# Patient Record
Sex: FEMALE | Race: WHITE | NOT HISPANIC OR LATINO
[De-identification: names, ages, dates, MRNs, and addresses within clinical notes are randomized per-mention and may not be internally consistent; named-entity substitution may affect disease eponyms.]

---

## 2017-07-05 ENCOUNTER — APPOINTMENT (OUTPATIENT)
Dept: ENDOCRINOLOGY | Facility: CLINIC | Age: 72
End: 2017-07-05

## 2017-07-05 VITALS
DIASTOLIC BLOOD PRESSURE: 76 MMHG | OXYGEN SATURATION: 98 % | HEART RATE: 72 BPM | BODY MASS INDEX: 20.66 KG/M2 | HEIGHT: 65 IN | WEIGHT: 124 LBS | SYSTOLIC BLOOD PRESSURE: 126 MMHG

## 2017-07-05 RX ORDER — CEFADROXIL 500 MG/1
500 CAPSULE ORAL
Qty: 14 | Refills: 0 | Status: COMPLETED | COMMUNITY
Start: 2017-06-06

## 2017-07-05 RX ORDER — MOMETASONE FUROATE 1 MG/ML
0.1 SOLUTION TOPICAL
Qty: 60 | Refills: 0 | Status: COMPLETED | COMMUNITY
Start: 2017-04-11

## 2017-07-05 RX ORDER — DENOSUMAB 60 MG/ML
60 INJECTION SUBCUTANEOUS
Qty: 1 | Refills: 0 | Status: COMPLETED | OUTPATIENT
Start: 2017-07-05

## 2017-07-05 RX ORDER — OXYCODONE AND ACETAMINOPHEN 5; 325 MG/1; MG/1
5-325 TABLET ORAL
Qty: 10 | Refills: 0 | Status: COMPLETED | COMMUNITY
Start: 2017-06-06

## 2017-07-05 RX ADMIN — DENOSUMAB 0 MG/ML: 60 INJECTION SUBCUTANEOUS at 00:00

## 2018-01-22 ENCOUNTER — APPOINTMENT (OUTPATIENT)
Dept: ENDOCRINOLOGY | Facility: CLINIC | Age: 73
End: 2018-01-22
Payer: MEDICARE

## 2018-01-22 VITALS
HEART RATE: 87 BPM | DIASTOLIC BLOOD PRESSURE: 68 MMHG | WEIGHT: 126 LBS | BODY MASS INDEX: 20.99 KG/M2 | HEIGHT: 65 IN | OXYGEN SATURATION: 98 % | SYSTOLIC BLOOD PRESSURE: 138 MMHG

## 2018-01-22 PROCEDURE — 96372 THER/PROPH/DIAG INJ SC/IM: CPT

## 2018-01-22 PROCEDURE — 99214 OFFICE O/P EST MOD 30 MIN: CPT | Mod: 25

## 2018-01-22 RX ORDER — DENOSUMAB 60 MG/ML
60 INJECTION SUBCUTANEOUS
Qty: 0 | Refills: 0 | Status: COMPLETED | OUTPATIENT
Start: 2018-01-22

## 2018-01-22 RX ADMIN — DENOSUMAB 0 MG/ML: 60 INJECTION SUBCUTANEOUS at 00:00

## 2018-03-15 ENCOUNTER — APPOINTMENT (OUTPATIENT)
Dept: OBGYN | Facility: CLINIC | Age: 73
End: 2018-03-15
Payer: MEDICARE

## 2018-03-15 VITALS
WEIGHT: 125 LBS | DIASTOLIC BLOOD PRESSURE: 67 MMHG | BODY MASS INDEX: 20.83 KG/M2 | SYSTOLIC BLOOD PRESSURE: 151 MMHG | HEIGHT: 65 IN

## 2018-03-15 DIAGNOSIS — Z01.419 ENCOUNTER FOR GYNECOLOGICAL EXAMINATION (GENERAL) (ROUTINE) W/OUT ABNORMAL FINDINGS: ICD-10-CM

## 2018-03-15 PROCEDURE — G0101: CPT

## 2018-03-18 LAB — HPV HIGH+LOW RISK DNA PNL CVX: NOT DETECTED

## 2018-03-22 LAB — CYTOLOGY CVX/VAG DOC THIN PREP: NORMAL

## 2018-08-10 ENCOUNTER — APPOINTMENT (OUTPATIENT)
Dept: ENDOCRINOLOGY | Facility: CLINIC | Age: 73
End: 2018-08-10
Payer: MEDICARE

## 2018-08-10 VITALS
HEIGHT: 65 IN | BODY MASS INDEX: 20.99 KG/M2 | OXYGEN SATURATION: 98 % | DIASTOLIC BLOOD PRESSURE: 58 MMHG | HEART RATE: 76 BPM | SYSTOLIC BLOOD PRESSURE: 100 MMHG | WEIGHT: 126 LBS

## 2018-08-10 PROCEDURE — 99214 OFFICE O/P EST MOD 30 MIN: CPT | Mod: 25

## 2018-08-10 PROCEDURE — 96372 THER/PROPH/DIAG INJ SC/IM: CPT

## 2018-08-10 RX ORDER — DENOSUMAB 60 MG/ML
60 INJECTION SUBCUTANEOUS
Qty: 0 | Refills: 0 | Status: COMPLETED | OUTPATIENT
Start: 2018-08-10

## 2018-08-10 RX ADMIN — DENOSUMAB 0 MG/ML: 60 INJECTION SUBCUTANEOUS at 00:00

## 2019-01-30 ENCOUNTER — APPOINTMENT (OUTPATIENT)
Dept: OTOLARYNGOLOGY | Facility: CLINIC | Age: 74
End: 2019-01-30
Payer: MEDICARE

## 2019-01-30 VITALS
WEIGHT: 125 LBS | BODY MASS INDEX: 20.09 KG/M2 | HEART RATE: 66 BPM | HEIGHT: 66 IN | SYSTOLIC BLOOD PRESSURE: 144 MMHG | DIASTOLIC BLOOD PRESSURE: 78 MMHG

## 2019-01-30 PROCEDURE — 69210 REMOVE IMPACTED EAR WAX UNI: CPT

## 2019-01-30 PROCEDURE — 99204 OFFICE O/P NEW MOD 45 MIN: CPT | Mod: 25

## 2019-01-30 PROCEDURE — 31231 NASAL ENDOSCOPY DX: CPT

## 2019-01-31 NOTE — REASON FOR VISIT
[Initial Evaluation] : an initial evaluation for [Hearing Loss] : hearing loss [Nasal Obstruction] : nasal obstruction [Epistaxis] : epistaxis

## 2019-01-31 NOTE — ASSESSMENT
[FreeTextEntry1] : DNS and Rhinitis\par Rx\par   Steam humidification and hypertonic saline nasal irrigations \par \par After informed verbal consent is obtained, cerumen is removed from the right and left  ear canal with a curette and suction.\par Rec: \par Debrox was prescribed and  is to be placed in both ears on a routine basis to keep them free of wax.\par Routine debridement suggested to keep the ears free of wax.\par \par bilateral sensorineural hearing loss-cleared for hearing aids\par \par \par

## 2019-01-31 NOTE — HISTORY OF PRESENT ILLNESS
[No] : patient does not have a  history of radiation therapy [de-identified] : 72 yo female\par chr moderate hearing loss w/assoc left tinnitus\par h/o meningioma\par also w/ moderate chr recurrent epistaxis\par no throat sx [Tinnitus] : tinnitus [Anxiety] : no anxiety [Dizziness] : no dizziness [Headache] : no headache [Hearing Loss] : hearing loss [Lightheadedness] : no lightheadedness [Neurologic Symptoms] : no associated neurologic symptoms [Orthostatic Hypotension] : no orthostatic hypotension [Otalgia] : no otalgia [Otorrhea] : no otorrhea [Vertigo] : no vertigo [Visual Changes] : no visual changes [Recurrent Otitis Media] : no recurrent otitis media [Otitis Media with Effusion] : no otitis media with effusion [Presbycusis] : presbycusis [Congenital Ear Malformation] : no congenital ear malformation [Meniere Disease] : no Meniere disease [Otosclerosis] : no otosclerosis [Perilymphatic Fistula] : no perilymphatic fistula [Hypertension] : no hypertension [Loud Noise Exposure] : no history of loud noise exposure [Smoking] : no smoking [Early Onset Hearing Loss] : no early onset hearing loss [Stroke] : no stroke [Facial Pain] : no facial pain [Facial Pressure] : no facial pressure [Nasal Congestion] : nasal congestion [None] : No associated symptoms are reported. [Chills] : no chills [Cough] : no cough [Ear Fullness] : no ear fullness [Environmental Allergens] : no environmental allergens [Allergies] : no allergies [Asthma] : no asthma

## 2019-01-31 NOTE — PHYSICAL EXAM
[de-identified] : bilat wax [Nasal Endoscopy Performed] : nasal endoscopy was performed, see procedure section for findings [] : septum deviated bilaterally [de-identified] : congested [Midline] : trachea located in midline position [Normal] : no rashes

## 2019-02-20 ENCOUNTER — APPOINTMENT (OUTPATIENT)
Dept: ENDOCRINOLOGY | Facility: CLINIC | Age: 74
End: 2019-02-20
Payer: MEDICARE

## 2019-02-20 VITALS
SYSTOLIC BLOOD PRESSURE: 116 MMHG | OXYGEN SATURATION: 98 % | BODY MASS INDEX: 19.59 KG/M2 | HEIGHT: 65.5 IN | DIASTOLIC BLOOD PRESSURE: 62 MMHG | HEART RATE: 75 BPM | WEIGHT: 119 LBS

## 2019-02-20 PROCEDURE — 96372 THER/PROPH/DIAG INJ SC/IM: CPT

## 2019-02-20 PROCEDURE — ZZZZZ: CPT

## 2019-02-20 PROCEDURE — 99214 OFFICE O/P EST MOD 30 MIN: CPT | Mod: 25

## 2019-02-20 PROCEDURE — 77080 DXA BONE DENSITY AXIAL: CPT | Mod: GA

## 2019-02-20 RX ORDER — DENOSUMAB 60 MG/ML
60 INJECTION SUBCUTANEOUS
Qty: 1 | Refills: 0 | Status: COMPLETED | OUTPATIENT
Start: 2019-02-20

## 2019-02-20 RX ORDER — ATORVASTATIN CALCIUM 10 MG/1
10 TABLET, FILM COATED ORAL
Refills: 0 | Status: COMPLETED | COMMUNITY

## 2019-02-20 RX ORDER — ACETAMINOPHEN AND PHENYLEPHRINE HYDROCHLORIDE 325; 5 MG/1; MG/1
TABLET, COATED ORAL
Refills: 0 | Status: ACTIVE | COMMUNITY

## 2019-02-20 RX ORDER — MULTIVIT-MIN/FA/LYCOPEN/LUTEIN .4-300-25
TABLET ORAL
Refills: 0 | Status: ACTIVE | COMMUNITY

## 2019-02-20 RX ADMIN — DENOSUMAB 0 MG/ML: 60 INJECTION SUBCUTANEOUS at 00:00

## 2019-02-20 NOTE — DATA REVIEWED
[FreeTextEntry1] : \par \par

## 2019-02-20 NOTE — PHYSICAL EXAM
[Alert] : alert [No Acute Distress] : no acute distress [Well Nourished] : well nourished [Well Developed] : well developed [Normal Sclera/Conjunctiva] : normal sclera/conjunctiva [No Proptosis] : no proptosis [Normal Oropharynx] : the oropharynx was normal [Thyroid Not Enlarged] : the thyroid was not enlarged [No Thyroid Nodules] : there were no palpable thyroid nodules [No Respiratory Distress] : no respiratory distress [No Accessory Muscle Use] : no accessory muscle use [Clear to Auscultation] : lungs were clear to auscultation bilaterally [Normal Rate] : heart rate was normal  [Normal S1, S2] : normal S1 and S2 [Regular Rhythm] : with a regular rhythm [Normal Bowel Sounds] : normal bowel sounds [Not Tender] : non-tender [Soft] : abdomen soft [Not Distended] : not distended [No HSM] : no hepato-splenomegaly [Post Cervical Nodes] : posterior cervical nodes [Anterior Cervical Nodes] : anterior cervical nodes [Normal] : normal and non tender [No Spinal Tenderness] : no spinal tenderness [Spine Straight] : spine straight [No Stigmata of Cushings Syndrome] : no stigmata of cushings syndrome [Normal Gait] : normal gait [Normal Strength/Tone] : muscle strength and tone were normal [No Rash] : no rash [Normal Reflexes] : deep tendon reflexes were 2+ and symmetric [No Tremors] : no tremors [Oriented x3] : oriented to person, place, and time [de-identified] : anicteric

## 2019-02-20 NOTE — ASSESSMENT
[FreeTextEntry1] : 73 year-old female with \par \par 1. Postmenopausal osteoporosis: Pt previously on long-term Fosamax but off therapy for approximately 4 years with moderate decline bone density. Additional risk factor of maternal h/o hip fx. Tolerating Prolia well since 2013. No thigh pain. No ONJ. No interval fx. BMD 2014 and 2016 improved in spine. BMD 2/2019 indicates stable osteopenia in all sites except normal density in the proximal radius. c/w Ca and Vit D. Continue Prolia buy and bill. \par \par 2. H/o subclinical hypothyroidism: pt remains clinically euthyroid. \par \par 3. Prediabetes: concerned about diabetes: Recent A1c is 5.7% consistent with stable prediabetes. Pt recently lost wt. \par \par f/u in 6 mons.  [Denosumab Therapy] : Risks  and benefits of denosumab therapy were discussed with the patient including eczema, cellulitis, osteonecrosis of the jaw and atypical femur fractures

## 2019-02-20 NOTE — END OF VISIT
[FreeTextEntry3] : I, Clarice Williamson, authored this note working as a medical scribe for Dr. Can.  02/20/2019. 10:00AM. \par This note was authored by Clarice Williamson working as medical scribe for me. I have reviewed, edited, and revised the note as needed. I am in agreement with the exam findings, imaging findings, and treatment plan.  Merrill Can MD

## 2019-02-20 NOTE — PROCEDURE
[FreeTextEntry1] : Bone mineral density: 02/20/2019 \par Indication: vs. 2016 \par Spine: -2.4 osteopenia, no significant change \par Total hip: -1.8 osteopenia, no significant change \par Femoral neck: -1.7 osteopenia, no significant change \par Proximal radius: -1.0 normal, no significant change \par \par Bone mineral density test December 20, 2016\par Two-year study\par Spine -2.4, osteopenia, +3.8% versus 2014\par Total hip - 1.8, osteopenia, no significant change\par Femoral neck -1.9, osteopenia, no significant change\par Proximal radius -1.2, osteopenia, no significant change\par \par bone density December one, 2014\par Spine -2.6,  osteoporosis, improved versus prior study -2.8,  2013\par Total hip -1.8, osteopenia, stable\par Femoral neck, -2.0, osteopenia, no prior result\par Partial radius -1.0, normal, no prior result

## 2019-02-20 NOTE — HISTORY OF PRESENT ILLNESS
[Calcium (dietary)] : dietary Calcium [Family History of Osteoporosis] : family history of osteoporosis [Family History of Hip Fracture] : family history of hip fracture [Regular Dental Follow-Up] : regular dental follow-up [Alendronate (Fosomax)] : Alendronate [Patient taking Meds Correctly] : Patient is taking meds correctly [Prolia (Denosumab)] : Prolia (Denosumab) [FreeTextEntry1] : f/u 74 y/o female for osteoporosis.\par \par H/o distant wrist fx; fell ice-skating. Took Alendronate for 8-9 yrs, stopped for 4 year drug holiday. No add'l fx. +FHx hip fx mother. Began Prolia Nov 2013. Tolerating well. no thigh pain. Last DDS within 3 mons. No ONJ. No interval fx. BMD in 2014 and 12//2016 improved in spine, stable in other sites. \par \par H/o prediabetes 5.9 then 5.7%. Recent wt loss. \par \par stable subclinical hypothyroidism. \par  [Amenorrhea] : no past or present history of amenorrhea [Disordered Eating] : no past or present history of disordered eating [Taking Steroids] : no past or present history of taking steroids [Kidney Stones] : no history of kidney stones [High Fall Risk] : no fall risk [Family History of Breast Cancer] : no family history of breast cancer [Hyperparathyroidism] : no hyperparathyroidism [History of Radiation Therapy] : no history of radiation therapy [History of Blood Clots] : no history of blood clots

## 2019-06-06 ENCOUNTER — APPOINTMENT (OUTPATIENT)
Dept: OTOLARYNGOLOGY | Facility: CLINIC | Age: 74
End: 2019-06-06
Payer: MEDICARE

## 2019-06-06 VITALS
DIASTOLIC BLOOD PRESSURE: 67 MMHG | HEIGHT: 66.5 IN | WEIGHT: 122 LBS | BODY MASS INDEX: 19.38 KG/M2 | SYSTOLIC BLOOD PRESSURE: 125 MMHG | HEART RATE: 77 BPM

## 2019-06-06 PROCEDURE — 99213 OFFICE O/P EST LOW 20 MIN: CPT | Mod: 25

## 2019-06-06 PROCEDURE — 69210 REMOVE IMPACTED EAR WAX UNI: CPT

## 2019-06-06 PROCEDURE — 92567 TYMPANOMETRY: CPT

## 2019-06-06 PROCEDURE — 92557 COMPREHENSIVE HEARING TEST: CPT

## 2019-06-06 NOTE — HISTORY OF PRESENT ILLNESS
[No] : patient does not have a  history of radiation therapy [Hearing Loss] : hearing loss [Presbycusis] : presbycusis [None] : No risk factors have been identified. [de-identified] : 74 y/o female w/ hx of hearing loss, meningioma, moderate chr recurrent epistaxis and  chr moderate hearing loss w/assoc left tinnitus who is her for her annual visit. She states she tinnitus in her L ear has subsided. She has not noticed any change in hearing b/l. She has not been having any nose bleeds. Otherwise she has no other modifying factors. Pt has no ear pain, ear drainage, hearing loss, tinnitus, vertigo, nasal congestion, nasal discharge, epistaxis, sinus infections, facial pain, facial pressure, throat pain, dysphagia or fevers.\par  [Tinnitus] : no tinnitus [Anxiety] : no anxiety [Dizziness] : no dizziness [Headache] : no headache [Lightheadedness] : no lightheadedness [Orthostatic Hypotension] : no orthostatic hypotension [Neurologic Symptoms] : no associated neurologic symptoms [Otalgia] : no otalgia [Otorrhea] : no otorrhea [Vertigo] : no vertigo [Visual Changes] : no visual changes [Recurrent Otitis Media] : no recurrent otitis media [Otitis Media with Effusion] : no otitis media with effusion [Congenital Ear Malformation] : no congenital ear malformation [Meniere Disease] : no Meniere disease [Otosclerosis] : no otosclerosis [Perilymphatic Fistula] : no perilymphatic fistula [Hypertension] : no hypertension [Loud Noise Exposure] : no history of loud noise exposure [Smoking] : no smoking [Early Onset Hearing Loss] : no early onset hearing loss [Stroke] : no stroke [Facial Pain] : no facial pain [Clear Rhinorrhea] : no clear rhinorrhea [Facial Pressure] : no facial pressure [Purulent Rhinorrhea] : no purulent rhinorrhea [Nasal Congestion] : no nasal congestion [Postnasal Drainage] : no postnasal drainage [Ear Fullness] : no ear fullness [Environmental Allergens] : no environmental allergens [Asthma] : no asthma [Allergies] : no allergies [Neck Mass] : no neck mass [Neck Pain] : no neck pain [Chills] : no chills [Cold Intolerance] : no cold intolerance [Cough] : no cough [Fatigue] : no fatigue [Heat Intolerance] : no heat intolerance [Hyperthyroidism] : no hyperthyroidism [Sialadenitis] : no sialadenitis [Hodgkin Disease] : no hodgkin disease [Non-Hodgkin Lymphoma] : no non-hodgkin lymphoma [Tobacco Use] : no tobacco use [Alcohol Use] : no alcohol use [Thyroid Cancer] : no thyroid cancer [Graves Disease] : no graves disease

## 2019-06-06 NOTE — PHYSICAL EXAM
[] : septum deviated bilaterally [Midline] : trachea located in midline position [de-identified] : bilat wax [Normal] : salivary glands are normal

## 2019-06-06 NOTE — REVIEW OF SYSTEMS
[Patient Intake Form Reviewed] : Patient intake form was reviewed [As Noted in HPI] : as noted in HPI [Negative] : Nasal

## 2019-06-06 NOTE — ASSESSMENT
[FreeTextEntry1] : 72 y/o female w/ hx of rhinitis and hearing loss b/l who has b/l cerumen impactions \par \par After informed verbal consent is obtained, cerumen is removed from the right and left  ear canal with a curette and suction.\par Rec: \par Debrox was prescribed and  is to be placed in both ears on a routine basis to keep them free of wax.\par Routine debridement suggested to keep the ears free of wax.\par \par bilateral sensorineural hearing loss-cleared for hearing aids\par \par Will f/u in 6 months \par

## 2019-06-06 NOTE — REASON FOR VISIT
[Subsequent Evaluation] : a subsequent evaluation for [Hearing Loss] : hearing loss [Epistaxis] : epistaxis [FreeTextEntry2] : wax

## 2019-08-21 ENCOUNTER — APPOINTMENT (OUTPATIENT)
Dept: ENDOCRINOLOGY | Facility: CLINIC | Age: 74
End: 2019-08-21
Payer: MEDICARE

## 2019-08-21 VITALS
BODY MASS INDEX: 20.09 KG/M2 | WEIGHT: 125 LBS | HEART RATE: 68 BPM | HEIGHT: 66 IN | SYSTOLIC BLOOD PRESSURE: 110 MMHG | DIASTOLIC BLOOD PRESSURE: 70 MMHG | OXYGEN SATURATION: 98 %

## 2019-08-21 PROCEDURE — 99214 OFFICE O/P EST MOD 30 MIN: CPT

## 2019-08-21 NOTE — HISTORY OF PRESENT ILLNESS
[Calcium (dietary)] : dietary Calcium [Alendronate (Fosomax)] : Alendronate [Patient taking Meds Correctly] : Patient is taking meds correctly [Prolia (Denosumab)] : Prolia (Denosumab) [Family History of Osteoporosis] : family history of osteoporosis [Family History of Hip Fracture] : family history of hip fracture [Regular Dental Follow-Up] : regular dental follow-up [Amenorrhea] : no past or present history of amenorrhea [FreeTextEntry1] : f/u 74 y/o female for osteoporosis.\par \par H/o distant wrist fx; fell ice-skating. Took Alendronate for 8-9 yrs, stopped for 4 year drug holiday. No add'l fx. +FHx hip fx mother. Began Prolia Nov 2013. Tolerating well. no thigh pain. DDS seen every 3 mons. No ONJ. No interval fx. BMD in 2014 and 12//2016 improved in spine, stable in other sites. BMD 2/2019 indicates stable osteopenia in all sites except normal density in the proximal radius. c/w Ca and Vit D.\par \par H/o prediabetes, last HgA1C 5.7%\par \par stable subclinical hypothyroidism. \par  [Disordered Eating] : no past or present history of disordered eating [Taking Steroids] : no past or present history of taking steroids [Kidney Stones] : no history of kidney stones [High Fall Risk] : no fall risk [Family History of Breast Cancer] : no family history of breast cancer [History of Radiation Therapy] : no history of radiation therapy [Hyperparathyroidism] : no hyperparathyroidism [History of Blood Clots] : no history of blood clots

## 2019-08-21 NOTE — ASSESSMENT
[Bisphosphonate Therapy] : Risks  and benefits of bisphosphonate therapy were  discussed with the patient including gastroesophageal irritation, osteonecrosis of the jaw, and atypical femur fractures, and acute phase reaction [FreeTextEntry1] : 73 year-old female with \par \par 1. Postmenopausal osteoporosis: Pt previously on long-term Fosamax but off therapy for approximately 4 years with moderate decline bone density. Additional risk factor of maternal h/o hip fx. Tolerating Prolia well since 2013. No thigh pain. No ONJ. No interval fx. BMD 2014 and 2016 improved in spine. BMD 2/2019 indicates stable osteopenia in all sites except normal density in the proximal radius. c/w Ca (9.1 norm, 2019) and Vit D.\par \par Based on 2/2019 BMD, the pt is stable and would benefit transitioning to Actonel to start a possible drug holiday following next f/u and BMD, to decrease risk of long term therapy side effects; ONJ or atypical femur fx. Risks and benefits discussed including interval fx, UGI sx, thigh pain, and ONJ. All questions were answered. Patient understands and agrees to start Actonel.\par \par 2. H/o subclinical hypothyroidism: pt remains clinically euthyroid. \par \par 3. Prediabetes: concerned about diabetes: Recent HgA1C is 5.7% consistent with stable prediabetes.\par \par f/u in 1 year w/ repeat BMD [Bisphosphonates] : The patient was instructed to take bisphosphonates on an empty stomach with a full glass of water,and wait at least 30 minutes before eating or lying down

## 2019-08-21 NOTE — PHYSICAL EXAM
[Alert] : alert [No Acute Distress] : no acute distress [Well Nourished] : well nourished [Well Developed] : well developed [No Proptosis] : no proptosis [Normal Sclera/Conjunctiva] : normal sclera/conjunctiva [Normal Oropharynx] : the oropharynx was normal [Thyroid Not Enlarged] : the thyroid was not enlarged [No Respiratory Distress] : no respiratory distress [No Thyroid Nodules] : there were no palpable thyroid nodules [No Accessory Muscle Use] : no accessory muscle use [Clear to Auscultation] : lungs were clear to auscultation bilaterally [Normal Rate] : heart rate was normal  [Normal S1, S2] : normal S1 and S2 [Regular Rhythm] : with a regular rhythm [Normal Bowel Sounds] : normal bowel sounds [Not Tender] : non-tender [Soft] : abdomen soft [Not Distended] : not distended [No HSM] : no hepato-splenomegaly [Post Cervical Nodes] : posterior cervical nodes [Anterior Cervical Nodes] : anterior cervical nodes [Normal] : normal and non tender [No Spinal Tenderness] : no spinal tenderness [Spine Straight] : spine straight [No Stigmata of Cushings Syndrome] : no stigmata of cushings syndrome [Normal Gait] : normal gait [Normal Strength/Tone] : muscle strength and tone were normal [No Rash] : no rash [Normal Reflexes] : deep tendon reflexes were 2+ and symmetric [No Tremors] : no tremors [Oriented x3] : oriented to person, place, and time [de-identified] : anicteric

## 2019-08-21 NOTE — END OF VISIT
[FreeTextEntry3] : I, Satish Vasquez, authored this note working as a medical scribe for Dr. Can.  08/21/2019. 10:30AM.  This note was authored by the medical scribe for me. I have reviewed, edited, and revised the note as needed. I am in agreement with the exam findings, imaging findings, and treatment plan.  Merrill Can MD

## 2019-12-05 ENCOUNTER — APPOINTMENT (OUTPATIENT)
Dept: OTOLARYNGOLOGY | Facility: CLINIC | Age: 74
End: 2019-12-05
Payer: MEDICARE

## 2019-12-05 VITALS
SYSTOLIC BLOOD PRESSURE: 111 MMHG | DIASTOLIC BLOOD PRESSURE: 66 MMHG | HEIGHT: 66 IN | HEART RATE: 70 BPM | WEIGHT: 125 LBS | BODY MASS INDEX: 20.09 KG/M2

## 2019-12-05 PROCEDURE — 99213 OFFICE O/P EST LOW 20 MIN: CPT | Mod: 25

## 2019-12-05 PROCEDURE — 69210 REMOVE IMPACTED EAR WAX UNI: CPT

## 2019-12-06 NOTE — HISTORY OF PRESENT ILLNESS
[No] : patient does not have a  history of radiation therapy [de-identified] : 72 y/o female w/ hx of hearing loss, meningioma, moderate chr recurrent epistaxis and  chr moderate hearing loss w/assoc left tinnitus who is her for her annual visit. She states she tinnitus in her L ear has subsided. She has not noticed any change in hearing b/l. She has not been having any nose bleeds. Otherwise she has no other modifying factors. Pt has no ear pain, ear drainage, hearing loss, tinnitus, vertigo, nasal congestion, nasal discharge, epistaxis, sinus infections, facial pain, facial pressure, throat pain, dysphagia or fevers.\par  [Tinnitus] : no tinnitus [Anxiety] : no anxiety [Dizziness] : no dizziness [Headache] : no headache [Hearing Loss] : hearing loss [Lightheadedness] : no lightheadedness [Neurologic Symptoms] : no associated neurologic symptoms [Orthostatic Hypotension] : no orthostatic hypotension [Otalgia] : no otalgia [Otorrhea] : no otorrhea [Vertigo] : no vertigo [Visual Changes] : no visual changes [Recurrent Otitis Media] : no recurrent otitis media [Otitis Media with Effusion] : no otitis media with effusion [Presbycusis] : presbycusis [Congenital Ear Malformation] : no congenital ear malformation [Meniere Disease] : no Meniere disease [Otosclerosis] : no otosclerosis [Perilymphatic Fistula] : no perilymphatic fistula [Hypertension] : no hypertension [Loud Noise Exposure] : no history of loud noise exposure [Smoking] : no smoking [Early Onset Hearing Loss] : no early onset hearing loss [Stroke] : no stroke [Facial Pain] : no facial pain [Purulent Rhinorrhea] : no purulent rhinorrhea [Nasal Congestion] : no nasal congestion [Clear Rhinorrhea] : no clear rhinorrhea [Facial Pressure] : no facial pressure [Postnasal Drainage] : no postnasal drainage [Ear Fullness] : no ear fullness [Environmental Allergens] : no environmental allergens [Allergies] : no allergies [Asthma] : no asthma [Neck Pain] : no neck pain [Neck Mass] : no neck mass [Chills] : no chills [Cold Intolerance] : no cold intolerance [Cough] : no cough [Hyperthyroidism] : no hyperthyroidism [Heat Intolerance] : no heat intolerance [Fatigue] : no fatigue [Non-Hodgkin Lymphoma] : no non-hodgkin lymphoma [Sialadenitis] : no sialadenitis [Hodgkin Disease] : no hodgkin disease [Tobacco Use] : no tobacco use [None] : No risk factors have been identified. [Alcohol Use] : no alcohol use [Graves Disease] : no graves disease [Thyroid Cancer] : no thyroid cancer

## 2019-12-06 NOTE — ASSESSMENT
[FreeTextEntry1] : 74 y/o female w/ hx of rhinitis and hearing loss b/l who has b/l cerumen impactions \par \par After informed verbal consent is obtained, cerumen is removed from the right and left  ear canal with a curette and suction.\par Rec: \par Debrox was prescribed and  is to be placed in both ears on a routine basis to keep them free of wax.\par Routine debridement suggested to keep the ears free of wax.\par \par bilateral sensorineural hearing loss-cleared for hearing aids\par \par Will f/u in 6 months \par

## 2019-12-06 NOTE — PHYSICAL EXAM
[] : septum deviated bilaterally [de-identified] : bilat wax [Normal] : no masses and lesions seen, face is symmetric [Midline] : trachea located in midline position

## 2019-12-19 ENCOUNTER — MESSAGE (OUTPATIENT)
Age: 74
End: 2019-12-19

## 2020-01-14 ENCOUNTER — APPOINTMENT (OUTPATIENT)
Dept: INTERNAL MEDICINE | Facility: CLINIC | Age: 75
End: 2020-01-14
Payer: MEDICARE

## 2020-01-14 VITALS
HEIGHT: 66 IN | HEART RATE: 67 BPM | SYSTOLIC BLOOD PRESSURE: 115 MMHG | TEMPERATURE: 97.6 F | BODY MASS INDEX: 19.44 KG/M2 | WEIGHT: 121 LBS | DIASTOLIC BLOOD PRESSURE: 70 MMHG | OXYGEN SATURATION: 98 %

## 2020-01-14 DIAGNOSIS — N95.2 POSTMENOPAUSAL ATROPHIC VAGINITIS: ICD-10-CM

## 2020-01-14 DIAGNOSIS — Z87.09 PERSONAL HISTORY OF OTHER DISEASES OF THE RESPIRATORY SYSTEM: ICD-10-CM

## 2020-01-14 DIAGNOSIS — Z92.29 PERSONAL HISTORY OF OTHER DRUG THERAPY: ICD-10-CM

## 2020-01-14 PROCEDURE — 93000 ELECTROCARDIOGRAM COMPLETE: CPT | Mod: 59

## 2020-01-14 PROCEDURE — G0442 ANNUAL ALCOHOL SCREEN 15 MIN: CPT | Mod: 59

## 2020-01-14 PROCEDURE — G0439: CPT

## 2020-01-14 NOTE — PHYSICAL EXAM
[No Masses] : no palpable masses [Normal Appearance] : normal in appearance [No Axillary Lymphadenopathy] : no axillary lymphadenopathy [No Nipple Discharge] : no nipple discharge [Normal Sphincter Tone] : normal sphincter tone [Stool Occult Blood] : stool negative for occult blood [No Mass] : no mass [Skin Lesions 1] : no skin lesions were observed [Abnormal Color] : normal color and pigmentation [Skin Turgor Decreased] : normal skin turgor [Comprehensive Foot Exam Normal] : Right and left foot were examined and both feet are normal. No ulcers in either foot. Toes are normal and with full ROM.  Normal tactile sensation with monofilament testing throughout both feet [Normal] : affect was normal and insight and judgment were intact [de-identified] : right clouding  [de-identified] : ruma K , no suspicious lesions

## 2020-01-14 NOTE — REVIEW OF SYSTEMS
[Constipation] : constipation [Negative] : Heme/Lymph [FreeTextEntry3] : total loss of vision on the right due to meningioma

## 2020-01-14 NOTE — PHYSICAL EXAM
[Normal Appearance] : normal in appearance [No Masses] : no palpable masses [No Axillary Lymphadenopathy] : no axillary lymphadenopathy [No Nipple Discharge] : no nipple discharge [Normal Sphincter Tone] : normal sphincter tone [Stool Occult Blood] : stool negative for occult blood [No Mass] : no mass [Abnormal Color] : normal color and pigmentation [Skin Lesions 1] : no skin lesions were observed [Skin Turgor Decreased] : normal skin turgor [Normal] : affect was normal and insight and judgment were intact [Comprehensive Foot Exam Normal] : Right and left foot were examined and both feet are normal. No ulcers in either foot. Toes are normal and with full ROM.  Normal tactile sensation with monofilament testing throughout both feet [de-identified] : right clouding  [de-identified] : ruma K , no suspicious lesions

## 2020-01-16 NOTE — HEALTH RISK ASSESSMENT
[Excellent] : ~his/her~  mood as  excellent [No] : No [College] : College [No falls in past year] : Patient reported no falls in the past year [Never (0 pts)] : Never (0 points) [0] : 1) Little interest or pleasure doing things: Not at all (0) [de-identified] : exercise DVD and walking  [Patient reported PAP Smear was normal] : Patient reported PAP Smear was normal [Patient reported mammogram was normal] : Patient reported mammogram was normal [Hepatitis C test declined] : Hepatitis C test declined [HIV test declined] : HIV test declined [Language] : denies difficulty with language [With Significant Other] : lives with significant other [None] : None [Retired] : retired [] :  [Sexually Active] : sexually active [Feels Safe at Home] : Feels safe at home [Fully functional (bathing, dressing, toileting, transferring, walking, feeding)] : Fully functional (bathing, dressing, toileting, transferring, walking, feeding) [Fully functional (using the telephone, shopping, preparing meals, housekeeping, doing laundry, using] : Fully functional and needs no help or supervision to perform IADLs (using the telephone, shopping, preparing meals, housekeeping, doing laundry, using transportation, managing medications and managing finances) [Reports changes in hearing] : Reports no changes in hearing [Reports normal functional visual acuity (ie: able to read med bottle)] : Reports normal functional visual acuity [Reports changes in vision] : Reports changes in vision [Reports changes in dental health] : Reports no changes in dental health [Carbon Monoxide Detector] : carbon monoxide detector [Smoke Detector] : smoke detector [Guns at Home] : no guns at home [Safety elements used in home] : safety elements used in home [Seat Belt] :  uses seat belt [Sunscreen] : uses sunscreen [Travel to Developing Areas] : does not  travel to developing areas [TB Exposure] : is not being exposed to tuberculosis [Caregiver Concerns] : does not have caregiver concerns [MammogramDate] : 01/19 [PapSmearDate] : 2018 [BoneDensityDate] : 2018 [ColonoscopyDate] : 1/18 [BoneDensityComments] : stable [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

## 2020-01-16 NOTE — HISTORY OF PRESENT ILLNESS
[FreeTextEntry1] : Here for full PE and reestablishment of care and med renewal  [de-identified] : Here for reestablishment of medical care and for full PE

## 2020-01-16 NOTE — ASSESSMENT
[FreeTextEntry1] : 1. Hyperlipidemia : 229/69/142 on 5 mg to go back to 10\par 2. hyperglycemia:  A1c =5.7  stable discussed increasing her cardiovasc exercise if she really wants to try to get it < 5.6 \par 3. meningioma stable for years followed at [facility] \par \par

## 2020-01-16 NOTE — ASSESSMENT
[FreeTextEntry1] : 1. Hyperlipidemia : 229/69/142 on 5 mg to go back to 10\par 2. hyperglycemia:  A1c =5.7  stable discussed increasing her cardiovasc exercise if she really wants to try to get it < 5.6 \par 3. meningioma stable for years followed at [facility] \par \par

## 2020-01-16 NOTE — HISTORY OF PRESENT ILLNESS
[FreeTextEntry1] : Here for full PE and reestablishment of care and med renewal  [de-identified] : Here for reestablishment of medical care and for full PE

## 2020-01-16 NOTE — HEALTH RISK ASSESSMENT
[Excellent] : ~his/her~  mood as  excellent [No] : No [College] : College [Never (0 pts)] : Never (0 points) [No falls in past year] : Patient reported no falls in the past year [0] : 1) Little interest or pleasure doing things: Not at all (0) [de-identified] : exercise DVD and walking  [Patient reported mammogram was normal] : Patient reported mammogram was normal [Patient reported PAP Smear was normal] : Patient reported PAP Smear was normal [Hepatitis C test declined] : Hepatitis C test declined [HIV test declined] : HIV test declined [Language] : denies difficulty with language [None] : None [With Significant Other] : lives with significant other [Retired] : retired [] :  [Sexually Active] : sexually active [Feels Safe at Home] : Feels safe at home [Fully functional (bathing, dressing, toileting, transferring, walking, feeding)] : Fully functional (bathing, dressing, toileting, transferring, walking, feeding) [Fully functional (using the telephone, shopping, preparing meals, housekeeping, doing laundry, using] : Fully functional and needs no help or supervision to perform IADLs (using the telephone, shopping, preparing meals, housekeeping, doing laundry, using transportation, managing medications and managing finances) [Reports changes in hearing] : Reports no changes in hearing [Reports changes in vision] : Reports changes in vision [Reports normal functional visual acuity (ie: able to read med bottle)] : Reports normal functional visual acuity [Smoke Detector] : smoke detector [Carbon Monoxide Detector] : carbon monoxide detector [Reports changes in dental health] : Reports no changes in dental health [Guns at Home] : no guns at home [Safety elements used in home] : safety elements used in home [Seat Belt] :  uses seat belt [Sunscreen] : uses sunscreen [Travel to Developing Areas] : does not  travel to developing areas [TB Exposure] : is not being exposed to tuberculosis [Caregiver Concerns] : does not have caregiver concerns [MammogramDate] : 01/19 [PapSmearDate] : 2018 [BoneDensityDate] : 2018 [ColonoscopyDate] : 1/18 [BoneDensityComments] : stable [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

## 2020-02-26 ENCOUNTER — APPOINTMENT (OUTPATIENT)
Dept: ENDOCRINOLOGY | Facility: CLINIC | Age: 75
End: 2020-02-26
Payer: MEDICARE

## 2020-02-26 VITALS
DIASTOLIC BLOOD PRESSURE: 70 MMHG | SYSTOLIC BLOOD PRESSURE: 110 MMHG | WEIGHT: 122 LBS | HEIGHT: 66 IN | BODY MASS INDEX: 19.61 KG/M2 | OXYGEN SATURATION: 98 % | HEART RATE: 73 BPM

## 2020-02-26 PROCEDURE — 99214 OFFICE O/P EST MOD 30 MIN: CPT

## 2020-02-26 NOTE — END OF VISIT
[] : Fellow [FreeTextEntry3] : Patient tolerating risedronate well for osteoporosis. History of subclinical hypothyroidism, clinically euthyroid and current examination. Prediabetes reviewed in detail the patient

## 2020-02-26 NOTE — PHYSICAL EXAM
[No Acute Distress] : no acute distress [Alert] : alert [Normal Sclera/Conjunctiva] : normal sclera/conjunctiva [Well Developed] : well developed [Well Nourished] : well nourished [No Proptosis] : no proptosis [Thyroid Not Enlarged] : the thyroid was not enlarged [Normal Oropharynx] : the oropharynx was normal [No Thyroid Nodules] : there were no palpable thyroid nodules [No Respiratory Distress] : no respiratory distress [No Accessory Muscle Use] : no accessory muscle use [Normal Rate] : heart rate was normal  [Clear to Auscultation] : lungs were clear to auscultation bilaterally [Normal Bowel Sounds] : normal bowel sounds [Normal S1, S2] : normal S1 and S2 [Regular Rhythm] : with a regular rhythm [Not Tender] : non-tender [Soft] : abdomen soft [No HSM] : no hepato-splenomegaly [Not Distended] : not distended [Anterior Cervical Nodes] : anterior cervical nodes [Post Cervical Nodes] : posterior cervical nodes [Normal] : normal and non tender [No Spinal Tenderness] : no spinal tenderness [Spine Straight] : spine straight [No Stigmata of Cushings Syndrome] : no stigmata of cushings syndrome [Normal Gait] : normal gait [No Rash] : no rash [Normal Strength/Tone] : muscle strength and tone were normal [No Tremors] : no tremors [Oriented x3] : oriented to person, place, and time [Normal Reflexes] : deep tendon reflexes were 2+ and symmetric [de-identified] : anicteric

## 2020-02-26 NOTE — HISTORY OF PRESENT ILLNESS
[Alendronate (Fosomax)] : Alendronate [Calcium (dietary)] : dietary Calcium [Patient taking Meds Correctly] : Patient is taking meds correctly [Prolia (Denosumab)] : Prolia (Denosumab) [Family History of Osteoporosis] : family history of osteoporosis [Regular Dental Follow-Up] : regular dental follow-up [Family History of Hip Fracture] : family history of hip fracture [FreeTextEntry1] : f/u 73 y/o female for osteoporosis.\par \par H/o distant wrist fx; fell ice-skating. Took Alendronate for 8-9 yrs, stopped for 4 year drug holiday. No add'l fx. +FHx hip fx mother. Began Prolia Nov 2013. Tolerating well. no thigh pain. DDS seen every 3 mons. No ONJ. No interval fx. BMD in 2014 and 12//2016 improved in spine, stable in other sites. BMD 2/2019 indicates stable osteopenia in all sites except normal density in the proximal radius. On last visit in Aug 2019, prolia was discontinued and was started on actonel. Reports tolerating well, taking appropriately. on Ca and Vit D.\par CMP WNL Jan 2020\par \par H/o prediabetes, last HgA1C 5.7%\par \par stable subclinical hypothyroidism. \par  [Amenorrhea] : no past or present history of amenorrhea [Disordered Eating] : no past or present history of disordered eating [Taking Steroids] : no past or present history of taking steroids [Kidney Stones] : no history of kidney stones [High Fall Risk] : no fall risk [Family History of Breast Cancer] : no family history of breast cancer [Hyperparathyroidism] : no hyperparathyroidism [History of Radiation Therapy] : no history of radiation therapy [History of Blood Clots] : no history of blood clots

## 2020-02-26 NOTE — ASSESSMENT
[Bisphosphonate Therapy] : Risks  and benefits of bisphosphonate therapy were  discussed with the patient including gastroesophageal irritation, osteonecrosis of the jaw, and atypical femur fractures, and acute phase reaction [Bisphosphonates] : The patient was instructed to take bisphosphonates on an empty stomach with a full glass of water,and wait at least 30 minutes before eating or lying down [FreeTextEntry1] : 74 year-old female with \par \par 1. Postmenopausal osteoporosis: Pt previously on long-term Fosamax but off therapy for approximately 4 years with moderate decline bone density. Additional risk factor of maternal h/o hip fx. Tolerating Prolia well since 2013. No thigh pain. No ONJ. No interval fx. BMD 2014 and 2016 improved in spine. BMD 2/2019 indicates stable osteopenia in all sites except normal density in the proximal radius. On last visit in Aug 2019, pt was deemed stable and would benefit transitioning to Actonel to start a possible drug holiday following next f/u and BMD, to decrease risk of long term therapy side effects; ONJ or atypical femur fx. Started on Actonel Aug 2019. C/w ca/vitamin D.\par Will repeat BMD in Aug 2020 to assess for possible drug holiday.\par \par 2. H/o subclinical hypothyroidism: pt remains clinically euthyroid. Will repeat TFTs at next visit\par \par 3. Prediabetes: concerned about diabetes: Recent HgA1C is 5.7% consistent with stable prediabetes. Hemoglobin A1c shows prediabetes. Natural history of prediabetes discussed in detail. Pt advised re: watching weight, maintaining moderate to low carbohydrate intake. Controversy concerning use of metformin for prediabetes reviewed. \par \par f/u in 6 mos w/ repeat BMD\par \par D/W Dr. Can

## 2020-04-09 ENCOUNTER — APPOINTMENT (OUTPATIENT)
Dept: INTERNAL MEDICINE | Facility: CLINIC | Age: 75
End: 2020-04-09
Payer: MEDICARE

## 2020-04-09 PROCEDURE — 99441: CPT

## 2020-06-11 ENCOUNTER — APPOINTMENT (OUTPATIENT)
Dept: OTOLARYNGOLOGY | Facility: CLINIC | Age: 75
End: 2020-06-11
Payer: MEDICARE

## 2020-06-11 VITALS
DIASTOLIC BLOOD PRESSURE: 71 MMHG | WEIGHT: 124 LBS | TEMPERATURE: 97.6 F | SYSTOLIC BLOOD PRESSURE: 134 MMHG | BODY MASS INDEX: 19.93 KG/M2 | HEART RATE: 66 BPM | HEIGHT: 66 IN

## 2020-06-11 DIAGNOSIS — H61.23 IMPACTED CERUMEN, BILATERAL: ICD-10-CM

## 2020-06-11 DIAGNOSIS — J34.2 DEVIATED NASAL SEPTUM: ICD-10-CM

## 2020-06-11 DIAGNOSIS — H93.12 TINNITUS, LEFT EAR: ICD-10-CM

## 2020-06-11 PROCEDURE — 69210 REMOVE IMPACTED EAR WAX UNI: CPT

## 2020-06-11 PROCEDURE — 99213 OFFICE O/P EST LOW 20 MIN: CPT | Mod: 25

## 2020-06-11 NOTE — HISTORY OF PRESENT ILLNESS
[No] : patient does not have a  history of radiation therapy [Hearing Loss] : hearing loss [Presbycusis] : presbycusis [None] : No risk factors have been identified. [de-identified] : 73 y/o female w/ hx of hearing loss, meningioma, moderate chr recurrent epistaxis and  chr moderate hearing loss w/assoc left tinnitus who is her for her annual visit. She states she tinnitus in her L ear has subsided. She has not noticed any change in hearing b/l. She has not been having any nose bleeds. Otherwise she has no other modifying factors. Pt has no ear pain, ear drainage, hearing loss, tinnitus, vertigo, nasal congestion, nasal discharge, epistaxis, sinus infections, facial pain, facial pressure, throat pain, dysphagia or fevers.\par  [Tinnitus] : no tinnitus [Anxiety] : no anxiety [Dizziness] : no dizziness [Headache] : no headache [Lightheadedness] : no lightheadedness [Neurologic Symptoms] : no associated neurologic symptoms [Orthostatic Hypotension] : no orthostatic hypotension [Otalgia] : no otalgia [Otorrhea] : no otorrhea [Vertigo] : no vertigo [Visual Changes] : no visual changes [Recurrent Otitis Media] : no recurrent otitis media [Otitis Media with Effusion] : no otitis media with effusion [Meniere Disease] : no Meniere disease [Congenital Ear Malformation] : no congenital ear malformation [Otosclerosis] : no otosclerosis [Perilymphatic Fistula] : no perilymphatic fistula [Loud Noise Exposure] : no history of loud noise exposure [Hypertension] : no hypertension [Smoking] : no smoking [Early Onset Hearing Loss] : no early onset hearing loss [Stroke] : no stroke [Clear Rhinorrhea] : no clear rhinorrhea [Facial Pain] : no facial pain [Purulent Rhinorrhea] : no purulent rhinorrhea [Facial Pressure] : no facial pressure [Postnasal Drainage] : no postnasal drainage [Nasal Congestion] : no nasal congestion [Ear Fullness] : no ear fullness [Environmental Allergens] : no environmental allergens [Allergies] : no allergies [Asthma] : no asthma [Neck Mass] : no neck mass [Neck Pain] : no neck pain [Chills] : no chills [Cough] : no cough [Cold Intolerance] : no cold intolerance [Heat Intolerance] : no heat intolerance [Fatigue] : no fatigue [Hyperthyroidism] : no hyperthyroidism [Sialadenitis] : no sialadenitis [Tobacco Use] : no tobacco use [Hodgkin Disease] : no hodgkin disease [Non-Hodgkin Lymphoma] : no non-hodgkin lymphoma [Graves Disease] : no graves disease [Alcohol Use] : no alcohol use [Thyroid Cancer] : no thyroid cancer

## 2020-06-11 NOTE — ASSESSMENT
[FreeTextEntry1] : 75 y/o female w/ hx of rhinitis and hearing loss b/l who has b/l cerumen impactions \par \par After informed verbal consent is obtained, cerumen is removed from the right and left  ear canal with a curette and suction.\par Rec: \par Debrox was prescribed and  is to be placed in both ears on a routine basis to keep them free of wax.\par Routine debridement suggested to keep the ears free of wax.\par \par bilateral sensorineural hearing loss-cleared for hearing aids\par \par f/u 1 year or prn  \par

## 2020-06-11 NOTE — PHYSICAL EXAM
[] : septum deviated bilaterally [Midline] : trachea located in midline position [Normal] : no masses and lesions seen, face is symmetric [de-identified] : bilat wax

## 2020-07-07 ENCOUNTER — APPOINTMENT (OUTPATIENT)
Dept: INTERNAL MEDICINE | Facility: CLINIC | Age: 75
End: 2020-07-07
Payer: MEDICARE

## 2020-07-07 DIAGNOSIS — J30.9 ALLERGIC RHINITIS, UNSPECIFIED: ICD-10-CM

## 2020-07-07 PROCEDURE — 99442: CPT | Mod: 95

## 2020-07-07 NOTE — HEALTH RISK ASSESSMENT
[No] : No [No falls in past year] : Patient reported no falls in the past year [0] : 2) Feeling down, depressed, or hopeless: Not at all (0) [] : No [de-identified] : walking daily , exercises 3x/wk

## 2020-07-07 NOTE — ASSESSMENT
[FreeTextEntry1] : 1.166/75/79/59 to cont the Lipitor \par 2 allergic rhinitis : to try Singulair 10 mg\par 3. Hyperglycemia: now 5.6 with increased exercise and diet control\par 4. PE in  [place]

## 2020-07-07 NOTE — HISTORY OF PRESENT ILLNESS
[Home] : at home, [unfilled] , at the time of the visit. [Medical Office: ([facility])___] : at the medical office located in  [Verbal consent obtained from patient] : the patient, [unfilled] [FreeTextEntry1] : for discussion of labs,general health , complains of allergies, takes Allegra but does not feel it works as well as she would like does not like nasal sprays [de-identified] : no new issues, no illness , entire family has been well. Has been exercising 3x/week and walking in her neighborhood daily . Complains of her allergic rhinitis

## 2020-11-05 ENCOUNTER — APPOINTMENT (OUTPATIENT)
Dept: ENDOCRINOLOGY | Facility: CLINIC | Age: 75
End: 2020-11-05
Payer: MEDICARE

## 2020-11-05 VITALS — DIASTOLIC BLOOD PRESSURE: 62 MMHG | OXYGEN SATURATION: 98 % | SYSTOLIC BLOOD PRESSURE: 134 MMHG | HEART RATE: 70 BPM

## 2020-11-05 VITALS — HEIGHT: 65.4 IN | TEMPERATURE: 98.1 F | WEIGHT: 115 LBS | BODY MASS INDEX: 18.93 KG/M2

## 2020-11-05 PROCEDURE — 77080 DXA BONE DENSITY AXIAL: CPT | Mod: GA

## 2020-11-05 PROCEDURE — ZZZZZ: CPT

## 2020-11-05 PROCEDURE — 96372 THER/PROPH/DIAG INJ SC/IM: CPT

## 2020-11-05 PROCEDURE — 99214 OFFICE O/P EST MOD 30 MIN: CPT | Mod: 25

## 2020-11-05 RX ORDER — DENOSUMAB 60 MG/ML
60 INJECTION SUBCUTANEOUS
Refills: 0 | Status: ACTIVE | COMMUNITY

## 2020-11-05 RX ORDER — RISEDRONATE SODIUM 150 MG/1
150 TABLET, FILM COATED ORAL
Qty: 3 | Refills: 3 | Status: DISCONTINUED | COMMUNITY
Start: 2019-08-21 | End: 2020-11-05

## 2020-11-05 RX ORDER — DENOSUMAB 60 MG/ML
60 INJECTION SUBCUTANEOUS
Qty: 1 | Refills: 0 | Status: COMPLETED | OUTPATIENT
Start: 2020-11-05

## 2020-11-05 RX ADMIN — DENOSUMAB 60 MG/ML: 60 INJECTION SUBCUTANEOUS at 00:00

## 2020-11-05 NOTE — REASON FOR VISIT
[Consultation] : a consultation visit [Follow - Up] : a follow-up visit [Osteoporosis] : osteoporosis

## 2020-11-09 NOTE — ASSESSMENT
[FreeTextEntry1] : 74 year-old female with \par \par 1. Postmenopausal osteoporosis: Pt previously on long-term Fosamax but off therapy for approximately 4 years with moderate decline bone density. Additional risk factor of maternal h/o hip fx. Tolerating Prolia well since 2013. No thigh pain. No ONJ. No interval fx. BMD 2014 and 2016 improved in spine. BMD 2/2019 indicates stable osteopenia in all sites except normal density in the proximal radius. On last visit in Aug 2019, pt was deemed stable and would benefit transitioning to Actonel to start a possible drug holiday following next f/u and BMD, to decrease risk of long term therapy side effects; ONJ or atypical femur fx. Started on Actonel Aug 2019. C/w ca/vitamin D.\par BMD 2020 decreased spine . options discussed.\par Restart Prolia buy and bill \par \par 2. H/o subclinical hypothyroidism: pt remains clinically euthyroid. Will repeat TFTs at next visit with Dr Dang \par \par 3. Prediabetes: concerned about diabetes: Prior   HgA1C is 5.7% consistent with stable prediabetes. Hemoglobin A1c shows prediabetes. Natural history of prediabetes discussed in detail. Pt advised re: watching weight, maintaining moderate to low carbohydrate intake. Controversy concerning use of metformin for prediabetes reviewed. \par Current A1c 5.6% normal \par f/u in 6 mos  \par \par D/W Dr. Can [Denosumab Therapy] : Risks  and benefits of denosumab therapy were discussed with the patient including eczema, cellulitis, osteonecrosis of the jaw and atypical femur fractures

## 2020-11-09 NOTE — HISTORY OF PRESENT ILLNESS
[Calcium (dietary)] : dietary Calcium [Alendronate (Fosomax)] : Alendronate [Patient taking Meds Correctly] : Patient is taking meds correctly [Prolia (Denosumab)] : Prolia (Denosumab) [Family History of Osteoporosis] : family history of osteoporosis [Family History of Hip Fracture] : family history of hip fracture [Regular Dental Follow-Up] : regular dental follow-up [FreeTextEntry1] : No significant interval health changes.  No interval surgery, hospitalizations, fractures, or change in medications. \par \par H/o distant wrist fx; fell ice-skating. Took Alendronate for 8-9 yrs, stopped for 4 year drug holiday. No add'l fx. +FHx hip fx mother. Began Prolia Nov 2013. Tolerating well. no thigh pain. DDS seen every 3 mons. No ONJ. No interval fx. BMD in 2014 and 12//2016 improved in spine, stable in other sites. BMD 2/2019 indicates stable osteopenia in all sites except normal density in the proximal radius.   Aug 2019, prolia was discontinued and was started on actonel. Reports tolerating well, taking appropriately. on Ca and Vit D.\par CMP WNL Jan 2020\par \par H/o prediabetes, last HgA1C 5.6%\par \par stable subclinical hypothyroidism. \par  [Amenorrhea] : no past or present history of amenorrhea [Disordered Eating] : no past or present history of disordered eating [Taking Steroids] : no past or present history of taking steroids [Kidney Stones] : no history of kidney stones [High Fall Risk] : no fall risk [Family History of Breast Cancer] : no family history of breast cancer [Hyperparathyroidism] : no hyperparathyroidism [History of Radiation Therapy] : no history of radiation therapy [History of Blood Clots] : no history of blood clots

## 2020-11-09 NOTE — PROCEDURE
[FreeTextEntry1] : Bone mineral density 11/5/20\par indication: vs 2019 change in Rx assess response to medication \par spine -2.7 osteoporosis -4.7%\par total hip -2.0 osteopenia no significant change\par femoral neck -1.6 osteopenia no significant change\par proximal radius -1.3 osteopenia no significant change\par \par Bone mineral density: 02/20/2019 \par Indication: vs. 2016 \par Spine: -2.4 osteopenia, no significant change \par Total hip: -1.8 osteopenia, no significant change \par Femoral neck: -1.7 osteopenia, no significant change \par Proximal radius: -1.0 normal, no significant change \par \par Bone mineral density test December 20, 2016\par Two-year study\par Spine -2.4, osteopenia, +3.8% versus 2014\par Total hip - 1.8, osteopenia, no significant change\par Femoral neck -1.9, osteopenia, no significant change\par Proximal radius -1.2, osteopenia, no significant change\par \par bone density December one, 2014\par Spine -2.6,  osteoporosis, improved versus prior study -2.8,  2013\par Total hip -1.8, osteopenia, stable\par Femoral neck, -2.0, osteopenia, no prior result\par Partial radius -1.0, normal, no prior result

## 2020-11-09 NOTE — PHYSICAL EXAM
[Alert] : alert [Well Nourished] : well nourished [No Acute Distress] : no acute distress [Well Developed] : well developed [Normal Sclera/Conjunctiva] : normal sclera/conjunctiva [EOMI] : extra ocular movement intact [No Proptosis] : no proptosis [Normal Oropharynx] : the oropharynx was normal [Thyroid Not Enlarged] : the thyroid was not enlarged [No Thyroid Nodules] : no palpable thyroid nodules [Clear to Auscultation] : lungs were clear to auscultation bilaterally [Normal S1, S2] : normal S1 and S2 [Normal Rate] : heart rate was normal [Regular Rhythm] : with a regular rhythm [No Edema] : no peripheral edema [Normal Bowel Sounds] : normal bowel sounds [Not Tender] : non-tender [Not Distended] : not distended [Soft] : abdomen soft [Normal Anterior Cervical Nodes] : no anterior cervical lymphadenopathy [Normal Posterior Cervical Nodes] : no posterior cervical lymphadenopathy [No Spinal Tenderness] : no spinal tenderness [Spine Straight] : spine straight [No Stigmata of Cushings Syndrome] : no stigmata of Cushings Syndrome [Normal Gait] : normal gait [Normal Strength/Tone] : muscle strength and tone were normal [No Rash] : no rash [Acanthosis Nigricans] : no acanthosis nigricans [Normal Reflexes] : deep tendon reflexes were 2+ and symmetric [No Tremors] : no tremors [Oriented x3] : oriented to person, place, and time

## 2020-12-11 DIAGNOSIS — H90.3 SENSORINEURAL HEARING LOSS, BILATERAL: ICD-10-CM

## 2021-01-19 ENCOUNTER — APPOINTMENT (OUTPATIENT)
Dept: INTERNAL MEDICINE | Facility: CLINIC | Age: 76
End: 2021-01-19
Payer: MEDICARE

## 2021-01-19 VITALS
HEART RATE: 78 BPM | HEIGHT: 65 IN | WEIGHT: 116 LBS | BODY MASS INDEX: 19.33 KG/M2 | OXYGEN SATURATION: 98 % | SYSTOLIC BLOOD PRESSURE: 152 MMHG | DIASTOLIC BLOOD PRESSURE: 70 MMHG | TEMPERATURE: 97.3 F

## 2021-01-19 VITALS — DIASTOLIC BLOOD PRESSURE: 80 MMHG | SYSTOLIC BLOOD PRESSURE: 130 MMHG

## 2021-01-19 DIAGNOSIS — C44.90 UNSPECIFIED MALIGNANT NEOPLASM OF SKIN, UNSPECIFIED: ICD-10-CM

## 2021-01-19 PROCEDURE — 99214 OFFICE O/P EST MOD 30 MIN: CPT

## 2021-01-19 NOTE — PHYSICAL EXAM
[Normal Appearance] : normal in appearance [No Masses] : no palpable masses [No Nipple Discharge] : no nipple discharge [No Axillary Lymphadenopathy] : no axillary lymphadenopathy [No Stool to Guaiac] : no stool to guaiac [Normal Sphincter Tone] : normal sphincter tone [No Mass] : no mass [Normal] : affect was normal and insight and judgment were intact

## 2021-01-19 NOTE — HISTORY OF PRESENT ILLNESS
[FreeTextEntry1] : here for annual full PE [de-identified] : Hx of \par meningioma\par hyperlipemia\par hyperglycemia \par OP \par no new issues

## 2021-01-19 NOTE — ASSESSMENT
[FreeTextEntry1] : 1 HLD: 182/75/94: at goal\par 2 LFT : resolved\par 3 Elevated TSH : euthyroid to follow\par 4. Meningioma followed at [place] , next Feb 2021\par 5 OP: followed by Dr Can, on prolia \par 6. Skin ca : follow up with Dr HARI Mckeon \par 7 HCM : optho 12/20 , rest up to date

## 2021-01-19 NOTE — HEALTH RISK ASSESSMENT
[Very Good] : ~his/her~ current health as very good [No] : No [No falls in past year] : Patient reported no falls in the past year [0] : 2) Feeling down, depressed, or hopeless: Not at all (0) [Patient reported bone density results were abnormal] : Patient reported bone density results were abnormal [Patient reported colonoscopy was normal] : Patient reported colonoscopy was normal [None] : None [With Significant Other] : lives with significant other [# of Members in Household ___] :  household currently consist of [unfilled] member(s) [Retired] : retired [] :  [Feels Safe at Home] : Feels safe at home [Smoke Detector] : smoke detector [Carbon Monoxide Detector] : carbon monoxide detector [Safety elements used in home] : safety elements used in home [Seat Belt] :  uses seat belt [Sunscreen] : uses sunscreen [Designated Healthcare Proxy] : Designated healthcare proxy [Name: ___] : Health Care Proxy's Name: [unfilled]  [No Retinopathy] : No retinopathy [] : No [EyeExamDate] : 12/2020 [Language] : denies difficulty with language [Behavior] : denies difficulty with behavior [Learning/Retaining New Information] : denies difficulty learning/retaining new information [Handling Complex Tasks] : denies difficulty handling complex tasks [Reasoning] : denies difficulty with reasoning [Spatial Ability and Orientation] : denies difficulty with spatial ability and orientation [Guns at Home] : no guns at home [Travel to Developing Areas] : does not  travel to developing areas [TB Exposure] : is not being exposed to tuberculosis [Caregiver Concerns] : does not have caregiver concerns [MammogramDate] : 1/20 [PapSmearDate] : n/a [BoneDensityDate] : 6/20 [BoneDensityComments] : followed by Dr Can  [ColonoscopyDate] : 2018

## 2021-04-07 ENCOUNTER — APPOINTMENT (OUTPATIENT)
Dept: OBGYN | Facility: CLINIC | Age: 76
End: 2021-04-07
Payer: MEDICARE

## 2021-04-07 VITALS — DIASTOLIC BLOOD PRESSURE: 66 MMHG | SYSTOLIC BLOOD PRESSURE: 124 MMHG

## 2021-04-07 VITALS — TEMPERATURE: 96 F

## 2021-04-07 PROCEDURE — G0101: CPT

## 2021-04-07 NOTE — HISTORY OF PRESENT ILLNESS
[FreeTextEntry1] : 76yo P3 PM here for annual exam\par h/o JUSTICE, salpingectomy w sling\par no complaints: no incontinence, VB or pelvic pain\par \par no bloating or change in appetite\par \par see 2 grandchildren 3-4 x a week,  both w Autism\par

## 2021-04-07 NOTE — PLAN
[FreeTextEntry1] : well woman\par \par f/u pap\par \par sees endo for osteoporosis\par \par discussed diet and exercise

## 2021-04-12 LAB — CYTOLOGY CVX/VAG DOC THIN PREP: ABNORMAL

## 2021-05-07 ENCOUNTER — APPOINTMENT (OUTPATIENT)
Dept: ENDOCRINOLOGY | Facility: CLINIC | Age: 76
End: 2021-05-07
Payer: MEDICARE

## 2021-05-07 VITALS
HEIGHT: 65 IN | OXYGEN SATURATION: 98 % | BODY MASS INDEX: 18.66 KG/M2 | TEMPERATURE: 97.7 F | SYSTOLIC BLOOD PRESSURE: 140 MMHG | WEIGHT: 112 LBS | HEART RATE: 66 BPM | DIASTOLIC BLOOD PRESSURE: 62 MMHG

## 2021-05-07 PROCEDURE — 99214 OFFICE O/P EST MOD 30 MIN: CPT | Mod: 25

## 2021-05-07 PROCEDURE — 96372 THER/PROPH/DIAG INJ SC/IM: CPT

## 2021-05-07 RX ORDER — DENOSUMAB 60 MG/ML
60 INJECTION SUBCUTANEOUS
Qty: 1 | Refills: 0 | Status: COMPLETED | OUTPATIENT
Start: 2021-05-07

## 2021-05-07 RX ADMIN — DENOSUMAB 60 MG/ML: 60 INJECTION SUBCUTANEOUS at 00:00

## 2021-05-07 NOTE — HISTORY OF PRESENT ILLNESS
[Calcium (dietary)] : dietary Calcium [Alendronate (Fosomax)] : Alendronate [Risedronate (Actonel)] : Risedronate [Prolia (Denosumab)] : Prolia (Denosumab) [Family History of Osteoporosis] : family history of osteoporosis [Family History of Hip Fracture] : family history of hip fracture [Regular Dental Follow-Up] : regular dental follow-up [FreeTextEntry1] : No significant interval health changes. No interval surgery, hospitalizations, fractures, or change in medications. \par \par H/o distant wrist fx; fell ice-skating. Took Alendronate for 8-9 yrs, stopped for 4 year drug holiday. No add'l fx. +FHx hip fx mother. Began Prolia Nov 2013. Tolerated well. BMD in 2014 and 12//2016 improved in spine, stable in other sites. BMD 2/2019 indicates stable osteopenia in all sites except normal density in the proximal radius. Started on Actonel Aug 2019. Tolerated well. BMD 2020 decreased spine. Restarted Prolia 11/2020. Tolerating well. No thigh pain, no interval fx. Last DDS within past 3 months. No ONJ. \par \par H/o prediabetes, last HgA1C 5.8%\par \par Stable subclinical hypothyroidism. [Amenorrhea] : no past or present history of amenorrhea [Disordered Eating] : no past or present history of disordered eating [Taking Steroids] : no past or present history of taking steroids [Kidney Stones] : no history of kidney stones [High Fall Risk] : no fall risk [Family History of Breast Cancer] : no family history of breast cancer [Hyperparathyroidism] : no hyperparathyroidism [History of Radiation Therapy] : no history of radiation therapy [History of Blood Clots] : no history of blood clots

## 2021-05-07 NOTE — END OF VISIT
[FreeTextEntry3] : I, Satish Vasquez, authored this note working as a medical scribe for Dr. Can.  05/07/2021. 10:00AM. This note was authored by the medical scribe for me. I have reviewed, edited, and revised the note as needed. I am in agreement with the exam findings, imaging findings, and treatment plan.  Merrill Can MD

## 2021-05-07 NOTE — ASSESSMENT
[Denosumab Therapy] : Risks  and benefits of denosumab therapy were discussed with the patient including eczema, cellulitis, osteonecrosis of the jaw and atypical femur fractures [FreeTextEntry1] : 75 year-old female with \par \par 1. Postmenopausal osteoporosis: Pt previously on long-term Fosamax but off therapy for approximately 4 years with moderate decline bone density. Additional risk factor of maternal h/o hip fx. Tolerated Prolia well since 2013. BMD 2014 and 2016 improved in spine. BMD 2/2019 indicates stable osteopenia in all sites except normal density in the proximal radius. On last visit in Aug 2019, pt was deemed stable and would benefit transitioning to Actonel to start a possible drug holiday following next f/u and BMD, to decrease risk of long term therapy side effects; ONJ or atypical femur fx. Started on Actonel Aug 2019. Tolerated well. BMD 2020 decreased spine. Options discussed. Restarted Prolia 11/2020. Tolerating well. No thigh pain, no interval fx. No ONJ. Continue Prolia, buy and bill.\par \par 2. H/o subclinical hypothyroidism: pt remains clinically euthyroid.\par \par 3. Prediabetes: concerned about diabetes: Prior HgA1C is 5.6% consistent with stable prediabetes. Hemoglobin A1c shows prediabetes. Natural history of prediabetes discussed in detail. Pt advised re: watching weight, maintaining moderate to low carbohydrate intake. Controversy concerning use of metformin for prediabetes reviewed.\par \par Labs reviewed: Ca 9.3, normal. TSH 5.25, elevated. Creatinine 0.77, normal.\par \par F/u in 6 months w/ BMD

## 2021-07-13 ENCOUNTER — APPOINTMENT (OUTPATIENT)
Dept: INTERNAL MEDICINE | Facility: CLINIC | Age: 76
End: 2021-07-13
Payer: MEDICARE

## 2021-07-13 ENCOUNTER — NON-APPOINTMENT (OUTPATIENT)
Age: 76
End: 2021-07-13

## 2021-07-13 VITALS
HEIGHT: 65 IN | SYSTOLIC BLOOD PRESSURE: 145 MMHG | OXYGEN SATURATION: 97 % | WEIGHT: 108 LBS | TEMPERATURE: 97.7 F | DIASTOLIC BLOOD PRESSURE: 70 MMHG | HEART RATE: 65 BPM | BODY MASS INDEX: 17.99 KG/M2

## 2021-07-13 VITALS — SYSTOLIC BLOOD PRESSURE: 120 MMHG | DIASTOLIC BLOOD PRESSURE: 70 MMHG

## 2021-07-13 PROCEDURE — 99214 OFFICE O/P EST MOD 30 MIN: CPT

## 2021-07-13 RX ORDER — PANTOPRAZOLE SODIUM 40 MG/1
40 GRANULE, DELAYED RELEASE ORAL DAILY
Qty: 30 | Refills: 0 | Status: DISCONTINUED | COMMUNITY
Start: 2021-07-13 | End: 2021-07-13

## 2021-07-13 NOTE — ASSESSMENT
[FreeTextEntry1] : 1. wt loss: likely that she is under stress  and not eating enough, however at her age and continued wt loss will start with a ct of the chest, abd, pelvis if neg then GI \par 2. Protonix 40 for 30 days \par 3 . will start on Paxil as it will calm her anxiety and help with wt gain, she is finding it disruptive, her daughters suffer from anx and are on meds\par 4 choles : 189//73/102/78 controlled \par 5. hyperglycemia : a1c = 5.8\par 6 . prev med up to date, received both last on 4/16 Pfizer , had colon, gyn, \par 7 OP on Prolia per endo

## 2021-07-13 NOTE — HISTORY OF PRESENT ILLNESS
[FreeTextEntry1] : Here for follow up for choles and wt loss [de-identified] : Has been losing a lot of wt over the last 6 mos, has been trying to get her  to lose weight and she has been slowly losing wt. She states she has a good appetite, and despite this her wt has slowly decreased. Has been under a lot of stress lately with special needs grandchild and  with OA in need of surgery \par \par Breakfast \par 1 egg\par 2 waffles \par 1 cup strawberries \par \par Lunch \par 2 slices of bread\par 2 slices of Etters cheese and tomatoes\par 1/4 cup of cashews  and raisins \par \par snack \par fruit \par \par Dinner 6\par shrimp 7 and salad\par banana at 8 pm

## 2021-07-21 ENCOUNTER — OUTPATIENT (OUTPATIENT)
Dept: OUTPATIENT SERVICES | Facility: HOSPITAL | Age: 76
LOS: 1 days | End: 2021-07-21
Payer: MEDICARE

## 2021-07-21 ENCOUNTER — APPOINTMENT (OUTPATIENT)
Dept: CT IMAGING | Facility: IMAGING CENTER | Age: 76
End: 2021-07-21

## 2021-07-21 ENCOUNTER — RESULT REVIEW (OUTPATIENT)
Age: 76
End: 2021-07-21

## 2021-07-21 ENCOUNTER — APPOINTMENT (OUTPATIENT)
Dept: RADIOLOGY | Facility: IMAGING CENTER | Age: 76
End: 2021-07-21

## 2021-07-21 DIAGNOSIS — R63.4 ABNORMAL WEIGHT LOSS: ICD-10-CM

## 2021-07-21 DIAGNOSIS — Z98.51 TUBAL LIGATION STATUS: Chronic | ICD-10-CM

## 2021-07-21 DIAGNOSIS — S62.109A FRACTURE OF UNSPECIFIED CARPAL BONE, UNSPECIFIED WRIST, INITIAL ENCOUNTER FOR CLOSED FRACTURE: Chronic | ICD-10-CM

## 2021-07-21 DIAGNOSIS — S62.102A FRACTURE OF UNSPECIFIED CARPAL BONE, LEFT WRIST, INITIAL ENCOUNTER FOR CLOSED FRACTURE: Chronic | ICD-10-CM

## 2021-07-21 PROCEDURE — 71260 CT THORAX DX C+: CPT | Mod: 26,MG

## 2021-07-21 PROCEDURE — G1004: CPT

## 2021-07-21 PROCEDURE — 74177 CT ABD & PELVIS W/CONTRAST: CPT

## 2021-07-21 PROCEDURE — 71046 X-RAY EXAM CHEST 2 VIEWS: CPT

## 2021-07-21 PROCEDURE — 71046 X-RAY EXAM CHEST 2 VIEWS: CPT | Mod: 26

## 2021-07-21 PROCEDURE — 71260 CT THORAX DX C+: CPT

## 2021-07-21 PROCEDURE — 74177 CT ABD & PELVIS W/CONTRAST: CPT | Mod: 26,MG

## 2021-07-21 PROCEDURE — 82565 ASSAY OF CREATININE: CPT

## 2021-07-27 ENCOUNTER — APPOINTMENT (OUTPATIENT)
Dept: INTERNAL MEDICINE | Facility: CLINIC | Age: 76
End: 2021-07-27
Payer: MEDICARE

## 2021-07-27 VITALS
TEMPERATURE: 98.1 F | OXYGEN SATURATION: 98 % | HEIGHT: 65 IN | BODY MASS INDEX: 17.99 KG/M2 | DIASTOLIC BLOOD PRESSURE: 70 MMHG | SYSTOLIC BLOOD PRESSURE: 142 MMHG | WEIGHT: 108 LBS | HEART RATE: 76 BPM

## 2021-07-27 VITALS — SYSTOLIC BLOOD PRESSURE: 130 MMHG | DIASTOLIC BLOOD PRESSURE: 80 MMHG

## 2021-07-27 PROCEDURE — 99214 OFFICE O/P EST MOD 30 MIN: CPT

## 2021-07-27 NOTE — ASSESSMENT
[FreeTextEntry1] : 1. wt loss: likely that she is under stress  CT not particularly revealing, claims stool is darker  not black and with dyspepsia interested in GI eval. and PUlm for the abnormal CT \par 2. Protonix 40  has helped \par 3 .Anxiety : off Paxil to see psych \par 4 choles : 189//73/102/78 controlled \par 5. hyperglycemia : a1c = 5.8\par 6 . prev med up to date, received both last on 4/16 Pfizer , had colon, gyn, \par 7 OP on Prolia per endo

## 2021-08-02 ENCOUNTER — NON-APPOINTMENT (OUTPATIENT)
Age: 76
End: 2021-08-02

## 2021-08-02 ENCOUNTER — TRANSCRIPTION ENCOUNTER (OUTPATIENT)
Age: 76
End: 2021-08-02

## 2021-08-02 ENCOUNTER — APPOINTMENT (OUTPATIENT)
Dept: GASTROENTEROLOGY | Facility: CLINIC | Age: 76
End: 2021-08-02
Payer: MEDICARE

## 2021-08-02 VITALS
BODY MASS INDEX: 17.83 KG/M2 | DIASTOLIC BLOOD PRESSURE: 60 MMHG | OXYGEN SATURATION: 98 % | HEIGHT: 65 IN | SYSTOLIC BLOOD PRESSURE: 102 MMHG | WEIGHT: 107 LBS | TEMPERATURE: 96.2 F | HEART RATE: 84 BPM

## 2021-08-02 PROCEDURE — 99203 OFFICE O/P NEW LOW 30 MIN: CPT

## 2021-08-02 NOTE — PHYSICAL EXAM
[General Appearance - Alert] : alert [General Appearance - In No Acute Distress] : in no acute distress [Sclera] : the sclera and conjunctiva were normal [PERRL With Normal Accommodation] : pupils were equal in size, round, and reactive to light [Extraocular Movements] : extraocular movements were intact [FreeTextEntry1] : Loss of vision in right eye. [Outer Ear] : the ears and nose were normal in appearance [Oropharynx] : the oropharynx was normal [Neck Appearance] : the appearance of the neck was normal [Neck Cervical Mass (___cm)] : no neck mass was observed [Jugular Venous Distention Increased] : there was no jugular-venous distention [Thyroid Diffuse Enlargement] : the thyroid was not enlarged [Thyroid Nodule] : there were no palpable thyroid nodules [Auscultation Breath Sounds / Voice Sounds] : lungs were clear to auscultation bilaterally [Heart Rate And Rhythm] : heart rate was normal and rhythm regular [Heart Sounds] : normal S1 and S2 [Heart Sounds Gallop] : no gallops [Murmurs] : no murmurs [Heart Sounds Pericardial Friction Rub] : no pericardial rub [Full Pulse] : the pedal pulses are present [Edema] : there was no peripheral edema [Bowel Sounds] : normal bowel sounds [Abdomen Soft] : soft [Abdomen Tenderness] : non-tender [Abdomen Mass (___ Cm)] : no abdominal mass palpated [Cervical Lymph Nodes Enlarged Posterior Bilaterally] : posterior cervical [Cervical Lymph Nodes Enlarged Anterior Bilaterally] : anterior cervical [Supraclavicular Lymph Nodes Enlarged Bilaterally] : supraclavicular [Axillary Lymph Nodes Enlarged Bilaterally] : axillary [Femoral Lymph Nodes Enlarged Bilaterally] : femoral [Inguinal Lymph Nodes Enlarged Bilaterally] : inguinal [No CVA Tenderness] : no ~M costovertebral angle tenderness [No Spinal Tenderness] : no spinal tenderness [Abnormal Walk] : normal gait [Nail Clubbing] : no clubbing  or cyanosis of the fingernails [Musculoskeletal - Swelling] : no joint swelling seen [Motor Tone] : muscle strength and tone were normal [Skin Color & Pigmentation] : normal skin color and pigmentation [Skin Turgor] : normal skin turgor [] : no rash [Deep Tendon Reflexes (DTR)] : deep tendon reflexes were 2+ and symmetric [Sensation] : the sensory exam was normal to light touch and pinprick [No Focal Deficits] : no focal deficits [Oriented To Time, Place, And Person] : oriented to person, place, and time [Impaired Insight] : insight and judgment were intact [Affect] : the affect was normal

## 2021-08-02 NOTE — HISTORY OF PRESENT ILLNESS
[Heartburn] : denies heartburn [Nausea] : denies nausea [Vomiting] : denies vomiting [Diarrhea] : denies diarrhea [Abdominal Swelling] : denies abdominal swelling [Rectal Pain] : denies rectal pain [Constipation] : constipation [Abdominal Pain] : abdominal pain [GERD] : no gastroesophageal reflux disease [Hiatus Hernia] : no hiatus hernia [Peptic Ulcer Disease] : no peptic ulcer disease [Pancreatitis] : no pancreatitis [Cholelithiasis] : no cholelithiasis [Kidney Stone] : no kidney stone [Inflammatory Bowel Disease] : no inflammatory bowel disease [Irritable Bowel Syndrome] : no irritable bowel syndrome [Diverticulitis] : no diverticulitis [Alcohol Abuse] : no alcohol abuse [Malignancy] : no malignancy [Abdominal Surgery] : no abdominal surgery [Appendectomy] : no appendectomy [Cholecystectomy] : no cholecystectomy [de-identified] : Over last few months patient has been having epigastric pain.  Described as a gnawing pain.  Worse when empty stomach.  She has been on pantoprazole 40 mg a day without helping.  Denies any heartburn or dysphagia or melena or hematemesis.  In the last few months she has lost 8 pounds weight though her appetite is good.  Her  is trying to lose weight so the food at home has been low calorie and low fat diet usually.\par \par Patient has history of chronic constipation able to move bowels daily with the use of Colace 100 mg a day she had colonoscopy in 2019 and was reportedly normal without any polyps or malignancy.\par \par Patient have a blood work-up which revealed normal thyroid function hemoglobin A1c was normal and no evidence of diabetes mellitus WBC count was normal with hemoglobin normal there is no suggestion of chronic infection or inflammation.  On CT chest and CT abdomen which were done this month no evidence of pancreatic malignancy point in superior segment of right lower lobe 8.9 into 1.5 cm in 2.9 cm density was seen a follow-up is recommended in 3 months.  There was some scarring on both lung apex and pleura from the old inflammation but no active pneumonia or any infection was seen.  Common bile duct or common hepatic duct is prominent 1.2 cm with minimal intrahepatic ductal dilatation bile duct tapers to normal in pancreatic area there is no evidence of any stones or any tumor seen in the pancreas bile duct gallbladder is normal kidneys are normal liver is normal with few cysts there is no abdominal lymphadenopathy there is no abdominal aortic aneurysm.  Patient post hysterectomy. [de-identified] : Epigastric pain for several months. [FreeTextEntry1] : Patient has history of anxiety and some depression.  She was placed on Paxil but she was unable to tolerate it and stopped it.  She denies any history of hypertension or diabetes mellitus or MI or angina or CHF or TIA or CVA.  The work-up for unexplained weight loss has been negative so far and included CT chest, CT abdomen, CT pelvis, blood work-up including work-up for thyrotoxicosis and diabetes mellitus.  Furthermore good appetite in spite of the weight loss is more in favor of depression as the possible cause of weight loss rather than malignancy of chronic infection.  But in view of the epigastric pain which is not responding to pantoprazole I am little concerned for the possibility of gastric malignancy also.  Patient have low alkaline phosphatase which usually indicate the patient has not been taking enough calories.  Otherwise her LFTs are normal there is no evidence of biliary duct obstruction on blood work-up.  Patient is blind in the right eye has history of osteoporosis and hyperlipidemia.  Patient is presently on atorvastatin 10 mg a day, Prolia 60 mg every 6-month pantoprazole 40 mg a day.

## 2021-08-02 NOTE — ASSESSMENT
[FreeTextEntry1] : Patient with epigastric pain for several months not responding to pantoprazole will need upper endoscopy to evaluate the lack of response to pantoprazole and make sure there is no gastric malignancy.  Pancreas was normal on CT abdomen.  Patient had a colonoscopy 2 years ago without any malignancy.  We will increase the doses of pantoprazole to 40 mg twice a day and schedule patient for gastroscopy indication risk-benefit of procedure discussed with the patient informed consent obtained.  Weight loss may be related to anxiety and depression as she did not tolerate Paxil we will change it to the Prozac 10 mg starting once a day and if tolerated in 2 weeks increase it to 20 mg a day.  Patient to see his psychiatrist and will stay on the Prozac till he makes any changes.  Indication risk-benefit of upper endoscopy discussed with the patient informed consent obtained and procedure rescheduled.

## 2021-08-02 NOTE — REASON FOR VISIT
[Initial Evaluation] : an initial evaluation [FreeTextEntry1] : Epigastric pain and unexplained weight loss

## 2021-08-10 ENCOUNTER — APPOINTMENT (OUTPATIENT)
Dept: GASTROENTEROLOGY | Facility: AMBULATORY MEDICAL SERVICES | Age: 76
End: 2021-08-10
Payer: MEDICARE

## 2021-08-10 PROCEDURE — 43239 EGD BIOPSY SINGLE/MULTIPLE: CPT

## 2021-10-29 ENCOUNTER — APPOINTMENT (OUTPATIENT)
Dept: GASTROENTEROLOGY | Facility: CLINIC | Age: 76
End: 2021-10-29
Payer: MEDICARE

## 2021-10-29 VITALS
OXYGEN SATURATION: 99 % | HEIGHT: 66 IN | BODY MASS INDEX: 18 KG/M2 | SYSTOLIC BLOOD PRESSURE: 110 MMHG | DIASTOLIC BLOOD PRESSURE: 80 MMHG | WEIGHT: 112 LBS | HEART RATE: 67 BPM | TEMPERATURE: 96.8 F

## 2021-10-29 PROCEDURE — 99213 OFFICE O/P EST LOW 20 MIN: CPT

## 2021-10-29 RX ORDER — PANTOPRAZOLE 40 MG/1
40 TABLET, DELAYED RELEASE ORAL DAILY
Qty: 30 | Refills: 0 | Status: DISCONTINUED | COMMUNITY
Start: 2021-07-13 | End: 2021-10-29

## 2021-10-29 RX ORDER — PANTOPRAZOLE 40 MG/1
40 TABLET, DELAYED RELEASE ORAL TWICE DAILY
Qty: 60 | Refills: 1 | Status: DISCONTINUED | COMMUNITY
Start: 2021-08-02 | End: 2021-10-29

## 2021-10-29 NOTE — ASSESSMENT
[FreeTextEntry1] : On Prozac 10 mg once a day her symptoms have significantly improved the weight loss has stabilized.  She is not 100% better but says at least 75% better.  Epigastric pain is more or less resolved bowel movements are improving.  The doses of pantoprazole changed from 40 mg twice a day to 40 mg alternating with 20 mg daily.  Will make an attempt to stop it in a month or so if possible.  She will continue Colace for the constipation.  Increase the dose of Prozac to 2 capsule a day if tolerated send a new prescription for 20 mg if on the other hand she feels uncomfortable on 20 mg and cut back on 10 mg a day patient understand the plan.  She has intention to see a psychiatrist ultimately for control of her anxiety.

## 2021-10-29 NOTE — REASON FOR VISIT
[Follow-Up: _____] : a [unfilled] follow-up visit [FreeTextEntry1] : Epigastric pain and unexplained weight loss

## 2021-10-29 NOTE — HISTORY OF PRESENT ILLNESS
[Heartburn] : denies heartburn [Nausea] : denies nausea [Vomiting] : denies vomiting [Diarrhea] : denies diarrhea [Constipation] : stable constipation [Yellow Skin Or Eyes (Jaundice)] : denies jaundice [Abdominal Pain] : denies abdominal pain [Abdominal Swelling] : denies abdominal swelling [Rectal Pain] : denies rectal pain [Abdominal Surgery] : abdominal surgery [Wt Gain ___ Lbs] : no recent weight gain [Wt Loss ___ Lbs] : no recent weight loss [GERD] : no gastroesophageal reflux disease [Peptic Ulcer Disease] : no peptic ulcer disease [Pancreatitis] : no pancreatitis [Cholelithiasis] : no cholelithiasis [Kidney Stone] : no kidney stone [Inflammatory Bowel Disease] : no inflammatory bowel disease [Irritable Bowel Syndrome] : no irritable bowel syndrome [Diverticulitis] : no diverticulitis [Alcohol Abuse] : no alcohol abuse [Appendectomy] : no appendectomy [Cholecystectomy] : no cholecystectomy [de-identified] : Patient was initially seen on August 2, 2021 for epigastric pain unexplained weight loss.  Patient was taking pantoprazole 40 mg a day which was not helping for the pain.  She she was also very anxious.  Patient was a started on pantoprazole 40 mg twice a day and Prozac 10 mg a day was added up to help with underlying anxiety since then patient symptom has significantly improved she was able to cut down the pantoprazole back to 40 mg a day and presently has no abdominal pain her weight has been stabilized.  She has history of chronic constipation but that is also getting better using Colace 100 mg a day which helps but now trying to go to the every other day and use of Colace.  She denies any abdominal pain at this time. [FreeTextEntry1] : Patient has past history of osteoporosis, allergic rhinitis.  She is blind in right eye history of hyperlipidemia patient on atorvastatin 10 mg a day and Prolia 60 mg every 6-month out upper endoscopy done on August 10 was normal.  There is no history of hypertension, diabetes mellitus, MI, angina, CHF, TIA.

## 2021-11-29 ENCOUNTER — RX RENEWAL (OUTPATIENT)
Age: 76
End: 2021-11-29

## 2021-12-07 ENCOUNTER — APPOINTMENT (OUTPATIENT)
Dept: ENDOCRINOLOGY | Facility: CLINIC | Age: 76
End: 2021-12-07
Payer: MEDICARE

## 2021-12-07 VITALS — DIASTOLIC BLOOD PRESSURE: 80 MMHG | SYSTOLIC BLOOD PRESSURE: 122 MMHG | OXYGEN SATURATION: 98 % | HEART RATE: 69 BPM

## 2021-12-07 VITALS — HEIGHT: 65 IN | WEIGHT: 111 LBS | TEMPERATURE: 97.8 F | BODY MASS INDEX: 18.49 KG/M2

## 2021-12-07 PROCEDURE — 77080 DXA BONE DENSITY AXIAL: CPT | Mod: GA

## 2021-12-07 PROCEDURE — 99214 OFFICE O/P EST MOD 30 MIN: CPT | Mod: 25

## 2021-12-07 PROCEDURE — ZZZZZ: CPT

## 2021-12-07 PROCEDURE — 96372 THER/PROPH/DIAG INJ SC/IM: CPT

## 2021-12-07 RX ORDER — DENOSUMAB 60 MG/ML
60 INJECTION SUBCUTANEOUS
Qty: 1 | Refills: 0 | Status: COMPLETED | OUTPATIENT
Start: 2021-12-07

## 2021-12-07 RX ADMIN — DENOSUMAB 60 MG/ML: 60 INJECTION SUBCUTANEOUS at 00:00

## 2021-12-07 NOTE — END OF VISIT
[FreeTextEntry3] : I, Satish Vasquez, authored this note working as a medical scribe for Dr. Can.  12/07/2021. 11:15AM. This note was authored by the medical scribe for me. I have reviewed, edited, and revised the note as needed. I am in agreement with the exam findings, imaging findings, and treatment plan.  Merrill Can MD

## 2021-12-07 NOTE — HISTORY OF PRESENT ILLNESS
[Alendronate (Fosomax)] : Alendronate [Risedronate (Actonel)] : Risedronate [Denosumab (Prolia)] : Denosumab [FreeTextEntry1] : No significant interval health changes. No interval surgery, hospitalizations, fractures, or change in medications.\par \par H/o distant wrist fx; fell ice-skating. Took Alendronate for 8-9 yrs, stopped for 4 year drug holiday. No add'l fx. +FHx hip fx mother. Began Prolia Nov 2013. Tolerated well. BMD in 2014 and 12//2016 improved in spine, stable in other sites. BMD 2/2019 indicates stable osteopenia in all sites except normal density in the proximal radius. Started on Actonel Aug 2019. Tolerated well. BMD 11/2020 decreased spine. Restarted Prolia 11/2020. Tolerating well. No thigh pain, no interval fx. Last DDS within past 6 months. No ONJ. \par \par Stable subclinical hypothyroidism. No local neck pain. No dysphagia or dysphonia. No raciness, shakiness, heat/cold intolerance, tiredness, or fatigue. No palpitations, tremors, or sudden weight gain/loss.\par \par H/o stable prediabetes.\par \par Started Prozac for anxiety.

## 2021-12-07 NOTE — PROCEDURE
[FreeTextEntry1] : Bone mineral density: 12/07/2021 \par Indication: vs. 2020 prior test showed bone loss\par Spine: -2.8 osteoporosis, no significant change\par Total hip: -2.0 osteopenia, no significant change\par Femoral neck: -2.0 osteopenia, -5.8%, suspect variability of the test\par Proximal radius: -1.1 osteopenia, no significant change\par \par Bone mineral density 11/5/20\par indication: vs 2019 change in Rx assess response to medication \par spine -2.7 osteoporosis -4.7%\par total hip -2.0 osteopenia no significant change\par femoral neck -1.6 osteopenia no significant change\par proximal radius -1.3 osteopenia no significant change\par \par Bone mineral density: 02/20/2019 \par Indication: vs. 2016 \par Spine: -2.4 osteopenia, no significant change \par Total hip: -1.8 osteopenia, no significant change \par Femoral neck: -1.7 osteopenia, no significant change \par Proximal radius: -1.0 normal, no significant change \par \par Bone mineral density test December 20, 2016\par Two-year study\par Spine -2.4, osteopenia, +3.8% versus 2014\par Total hip - 1.8, osteopenia, no significant change\par Femoral neck -1.9, osteopenia, no significant change\par Proximal radius -1.2, osteopenia, no significant change\par \par bone density December one, 2014\par Spine -2.6,  osteoporosis, improved versus prior study -2.8,  2013\par Total hip -1.8, osteopenia, stable\par Femoral neck, -2.0, osteopenia, no prior result\par Partial radius -1.0, normal, no prior result

## 2021-12-07 NOTE — ASSESSMENT
[Denosumab Therapy] : Risks  and benefits of denosumab therapy were discussed with the patient including eczema, cellulitis, osteonecrosis of the jaw and atypical femur fractures [FreeTextEntry1] : 75 year-old female with \par \par 1. Postmenopausal osteoporosis: Pt previously on long-term Fosamax but off therapy for approximately 4 years with moderate decline bone density. Additional risk factor of maternal h/o hip fx. Tolerated Prolia well since 2013. BMD 2014 and 2016 improved in spine. BMD 2/2019 indicates stable osteopenia in all sites except normal density in the proximal radius. On last visit in Aug 2019, pt was deemed stable and would benefit transitioning to Actonel to start a possible drug holiday following next f/u and BMD, to decrease risk of long term therapy side effects; ONJ or atypical femur fx. Started on Actonel Aug 2019. Tolerated well. BMD 11/2020 decreased spine. Options discussed. Restarted Prolia 11/2020. Tolerating well. No thigh pain, no interval fx. Normal Ca. No ONJ. BMD 12/2021 indicates stable osteoporosis in spine, stable osteopenia in total hip and proximal radius, but worsened osteopenia in femoral neck although suspect variability of the test. BMD results reviewed w/ pt. Continue Prolia, buy and bill.\par \par 2. H/o subclinical hypothyroidism: pt remains clinically euthyroid. No local neck pain. No dysphagia or dysphonia. No raciness, shakiness, heat/cold intolerance, tiredness, or fatigue. No palpitations, tremors, or sudden weight gain/loss.\par \par 3. Hemoglobin A1c 5.8% shows stable prediabetes. Natural history of prediabetes discussed in detail. Pt advised re: watching weight, maintaining moderate to low carbohydrate intake. Controversy concerning use of metformin for prediabetes reviewed.\par \par Labs 7/2021 reviewed: Ca 9.3, normal. Creatinine 0.79, normal.\par \par Request pt repeat TFT w/ Dr. Anastasia Dang and to send me results.\par \par F/u in 6 months

## 2022-01-04 ENCOUNTER — APPOINTMENT (OUTPATIENT)
Dept: INTERNAL MEDICINE | Facility: CLINIC | Age: 77
End: 2022-01-04
Payer: MEDICARE

## 2022-01-04 ENCOUNTER — RESULT CHARGE (OUTPATIENT)
Age: 77
End: 2022-01-04

## 2022-01-04 VITALS
WEIGHT: 108 LBS | SYSTOLIC BLOOD PRESSURE: 143 MMHG | BODY MASS INDEX: 17.99 KG/M2 | DIASTOLIC BLOOD PRESSURE: 80 MMHG | OXYGEN SATURATION: 98 % | HEART RATE: 68 BPM | TEMPERATURE: 97.3 F | HEIGHT: 65 IN

## 2022-01-04 VITALS — SYSTOLIC BLOOD PRESSURE: 120 MMHG | DIASTOLIC BLOOD PRESSURE: 80 MMHG

## 2022-01-04 PROCEDURE — 99214 OFFICE O/P EST MOD 30 MIN: CPT

## 2022-01-04 PROCEDURE — 83036 HEMOGLOBIN GLYCOSYLATED A1C: CPT | Mod: QW

## 2022-01-04 NOTE — HISTORY OF PRESENT ILLNESS
[FreeTextEntry1] : Here for follow up, cont to slowly lose weight( the same as July, and then down again) seen at GI , was  changed from Paxil to Prozac  [de-identified] : Feels well\par \par Breakfast: \par 2 gluten free waffles and scrambled egg , 1/2 cup blueberries and coffee\par \par snack \par oat and honey bar\par \par lunch \par 1/2 c cottage cheese, 2 strawberries and 1/4 cup walnuts  \par or\par 1 piece of bread with 1 slice of cheese\par \par anxiety is under control \par \par snack\par nilesh \par \par Dinner \par gluten free pasta 1c , 4 0z tomato sauce, cheese on top 1/4c salad\par dessert: 1/4 nuts and 2T raisins\par 1 banana prior to bed

## 2022-01-04 NOTE — ASSESSMENT
[FreeTextEntry1] : 1. wt loss: Had a full evaluation , but clearly does not eat enough as she is measuring everytning as she got used to this for her  and eating about [id number] alen per day and should be eating about 2000 alen /day \par 2. Protonix 40  has helped \par 3 .Anxiety : off Paxil to see psych, but now on prozac per GI and feeling better , has an appointment with psych in the future  \par 4 choles : 189//73/102/78 controlled \par 5. hyperglycemia : a1c = 5.8 in the past and now 7.8, i am skeptical of this change rachel  in light of her 85 fasting repeat is 5.6 therefore 7.8 is a lab error. \par 6 . prev med up to date, received 3   , had colon, gyn, \par 7 OP on Prolia per endo

## 2022-01-24 ENCOUNTER — RX RENEWAL (OUTPATIENT)
Age: 77
End: 2022-01-24

## 2022-01-24 ENCOUNTER — APPOINTMENT (OUTPATIENT)
Dept: GASTROENTEROLOGY | Facility: CLINIC | Age: 77
End: 2022-01-24
Payer: MEDICARE

## 2022-01-24 VITALS
SYSTOLIC BLOOD PRESSURE: 120 MMHG | WEIGHT: 110 LBS | HEIGHT: 65 IN | TEMPERATURE: 96.8 F | DIASTOLIC BLOOD PRESSURE: 80 MMHG | HEART RATE: 67 BPM | BODY MASS INDEX: 18.33 KG/M2 | OXYGEN SATURATION: 99 %

## 2022-01-24 PROCEDURE — 99213 OFFICE O/P EST LOW 20 MIN: CPT

## 2022-01-24 RX ORDER — PANTOPRAZOLE 20 MG/1
20 TABLET, DELAYED RELEASE ORAL TWICE DAILY
Qty: 60 | Refills: 3 | Status: DISCONTINUED | COMMUNITY
Start: 2021-10-29 | End: 2022-01-24

## 2022-01-24 RX ORDER — FLUOXETINE HYDROCHLORIDE 10 MG/1
10 CAPSULE ORAL
Qty: 90 | Refills: 3 | Status: DISCONTINUED | COMMUNITY
Start: 2022-01-24 | End: 2022-01-24

## 2022-01-24 RX ORDER — FLUOXETINE HYDROCHLORIDE 10 MG/1
10 CAPSULE ORAL DAILY
Qty: 60 | Refills: 2 | Status: DISCONTINUED | COMMUNITY
Start: 2021-08-02 | End: 2022-01-24

## 2022-01-24 NOTE — HISTORY OF PRESENT ILLNESS
[Heartburn] : denies heartburn [Nausea] : denies nausea [Vomiting] : denies vomiting [Diarrhea] : denies diarrhea [Constipation] : denies constipation [Yellow Skin Or Eyes (Jaundice)] : denies jaundice [Abdominal Pain] : denies abdominal pain [Abdominal Swelling] : denies abdominal swelling [Rectal Pain] : denies rectal pain [Abdominal Surgery] : abdominal surgery [Wt Gain ___ Lbs] : no recent weight gain [Wt Loss ___ Lbs] : no recent weight loss [GERD] : no gastroesophageal reflux disease [Hiatus Hernia] : no hiatus hernia [Peptic Ulcer Disease] : no peptic ulcer disease [Pancreatitis] : no pancreatitis [Cholelithiasis] : no cholelithiasis [Kidney Stone] : no kidney stone [Inflammatory Bowel Disease] : no inflammatory bowel disease [Irritable Bowel Syndrome] : no irritable bowel syndrome [Diverticulitis] : no diverticulitis [Alcohol Abuse] : no alcohol abuse [Malignancy] : no malignancy [Appendectomy] : no appendectomy [Cholecystectomy] : no cholecystectomy [de-identified] : Patient was initially seen for epigastric pain which was not responding to the pantoprazole 40 mg twice a day and unexplained weight loss.  Both of them were thought to be related to her underlying anxiety.  Patient initially was a started on Paxil 10 mg a day but as she did not tolerate it it was switched to fluoxetine 10 mg a day.  Patient significantly improved this medication to the extent of 75% reduction in her symptoms rate got stabilized around 110 pound.  Patient has been shaking around 1200 alen a day that may be the reason her weight loss.  The doses of fluoxetine was increased to 10 mg 2 capsule a day to further improve the symptom control but she never took more than 1 capsule a day and she is feeling better and rather stay on 10 mg a day.  Presently has no abdominal pain appetite is good bowel movements are more normal now and have cut down Colace to 50 mg a day.  She had upper endoscopy and colonoscopy in the last 2 years no malignancy was seen.\par \par I spoke to the patient regarding her CT chest finding 6 months ago regarding a shadow in the right lower lung lobe whether it is result to the scarring or represent any significant pathology patient will follow up with her pulmonary consultant to see if she needs a repeat CT scan of the chest. [FreeTextEntry1] : History of osteoporosis, allergic rhinitis, blind in the right eye hyperlipidemia.  No history of hypertension diabetes mellitus MI angina CHF TIA or CVA.

## 2022-01-24 NOTE — ASSESSMENT
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  Patient did not wish to take increased doses of fluoxetine so I will keep on 10 mg a day.  She can stop the pantoprazole and change it to as needed basis.  She will have follow-up with her internist and see me only as needed.

## 2022-02-18 ENCOUNTER — APPOINTMENT (OUTPATIENT)
Dept: PULMONOLOGY | Facility: CLINIC | Age: 77
End: 2022-02-18
Payer: MEDICARE

## 2022-02-18 ENCOUNTER — NON-APPOINTMENT (OUTPATIENT)
Age: 77
End: 2022-02-18

## 2022-02-18 VITALS — WEIGHT: 110 LBS | BODY MASS INDEX: 18.31 KG/M2

## 2022-02-18 VITALS
SYSTOLIC BLOOD PRESSURE: 135 MMHG | TEMPERATURE: 97.1 F | OXYGEN SATURATION: 98 % | RESPIRATION RATE: 16 BRPM | DIASTOLIC BLOOD PRESSURE: 74 MMHG | HEART RATE: 69 BPM

## 2022-02-18 PROCEDURE — 99204 OFFICE O/P NEW MOD 45 MIN: CPT

## 2022-02-18 RX ORDER — PANTOPRAZOLE 20 MG/1
20 TABLET, DELAYED RELEASE ORAL
Refills: 0 | Status: DISCONTINUED | COMMUNITY
End: 2022-02-18

## 2022-02-18 NOTE — HISTORY OF PRESENT ILLNESS
[Never] : never [TextBox_4] : JACQUELYN PRICE is a 76 year old female who presents with abnormal xray/ ct chest\par she had the xray done for about 5-10 lb weight loss prior to the PCP visit\par \par she deneis  cough no wheezing no dyspnea\par no previous xray done\par  \par \par xray done 7/2021\par ct chest 7/2021\par \par no exposure to second hand smoke\par never smoker\par no toxin exposure\par \par

## 2022-02-18 NOTE — CONSULT LETTER
[Dear  ___] : Dear  [unfilled], [Consult Letter:] : I had the pleasure of evaluating your patient, [unfilled]. [Please see my note below.] : Please see my note below. [Consult Closing:] : Thank you very much for allowing me to participate in the care of this patient.  If you have any questions, please do not hesitate to contact me. [Sincerely,] : Sincerely, [FreeTextEntry3] : Aracely Jose D.O.\par [facility]\par [phone]\par [email or web address]\par

## 2022-02-18 NOTE — PROCEDURE
[FreeTextEntry1] : \par \par \par EXAM: CT CHEST IC\par \par EXAM: CT ABDOMEN AND PELVIS OC IC\par \par \par PROCEDURE DATE: 07/21/2021\par \par \par \par INTERPRETATION: CLINICAL INFORMATION: 3 month of weight loss\par \par COMPARISON: None.\par \par CONTRAST/COMPLICATIONS:\par IV Contrast: Omnipaque 350 90 cc administered 10 cc discarded\par Oral Contrast: Omnipaque 300\par Complications: None reported at time of study completion\par \par PROCEDURE:\par CT of the Chest, Abdomen and Pelvis was performed.\par Sagittal and coronal reformats were performed.\par \par FINDINGS:\par CHEST:\par LUNGS AND LARGE AIRWAYS: Patent central airways. 0.9 x 1.5 x 0.9 cm opacity in the superior segment of the right lower lobe seen best on series 2 image 25 and series 601 image 59. This may be related to scarring and/or atelectasis although if no old films are available for comparison, short interval follow-up in 3 months is recommended. Biapical scarring which is greater on the right with calcifications.\par PLEURA: Mild right apical pleural thickening presumably related to scarring.\par VESSELS: Within normal limits.\par HEART: Heart size is normal. No pericardial effusion.\par MEDIASTINUM AND ALICIA: No lymphadenopathy.\par CHEST WALL AND LOWER NECK: Within normal limits.\par \par ABDOMEN AND PELVIS:\par LIVER: Hepatomegaly. Few less than 1 cm low-attenuation lesions in the liver which are too small to characterize.\par BILE DUCTS: Prominent proximal common bile duct versus common hepatic duct measuring up to 1.2 cm which tapers to normal in the distal CBD. No discrete lesions are identified. The gallbladder is normal in caliber. There is question of very minimal intrahepatic biliary duct dilatation in the right lobe.\par GALLBLADDER: Unremarkable\par SPLEEN: Within normal limits.\par PANCREAS: Within normal limits. Pancreatic duct is normal in caliber.\par ADRENALS: Within normal limits.\par KIDNEYS/URETERS: 2.2 cm right upper pole renal cyst.\par \par BLADDER: Grossly unremarkable.\par REPRODUCTIVE ORGANS: Uterus not well visualized. Please correlate with patient's history. Large amount of bowel gas and paucity of intra-abdominal fat limiting evaluation.\par \par BOWEL: No bowel obstruction. Mild diastases of the anterior abdominal wall inferiorly with small bowel extending to this region without gross herniation. A portion of the appendix is visualized and it is within normal limits. The amount of stool is seen throughout the colon. Oral contrast has not yet reached the large bowel which limits evaluation.\par PERITONEUM: No ascites.\par VESSELS: Mild vascular calcifications.\par RETROPERITONEUM/LYMPH NODES: No lymphadenopathy.\par ABDOMINAL WALL: Within normal limits.\par BONES: Mild degenerative changes of bone.\par \par IMPRESSION:\par No discrete mass is identified. There is prominence of the proximal CBD or common hepatic duct with minimal intrahepatic biliary duct dilatation. This tapers to normal in the distal CBD and the gallbladder is not distended. No discrete mass is identified although MRCP is recommended for further evaluation. Hepatomegaly. Few less than 1 cm low-attenuation lesions in the liver which cannot be further characterized.\par \par Right apical scarring with calcification. 1.5 x 0.9 x 0.9 cm focal opacity in the superior segment of the right lower lobe posterior medially. This may represent scarring and/or atelectasis. Short interval follow-up in 3-9 months is recommended.\par \par Large amount of stool in the colon. Additional findings as above.\par \par \par \par --- End of Report ---

## 2022-02-18 NOTE — PHYSICAL EXAM
[No Acute Distress] : no acute distress [Normal Rate/Rhythm] : normal rate/rhythm [Normal S1, S2] : normal s1, s2 [No Resp Distress] : no resp distress [No Acc Muscle Use] : no acc muscle use [Clear to Auscultation Bilaterally] : clear to auscultation bilaterally

## 2022-03-08 ENCOUNTER — APPOINTMENT (OUTPATIENT)
Dept: CT IMAGING | Facility: IMAGING CENTER | Age: 77
End: 2022-03-08
Payer: MEDICARE

## 2022-03-08 ENCOUNTER — OUTPATIENT (OUTPATIENT)
Dept: OUTPATIENT SERVICES | Facility: HOSPITAL | Age: 77
LOS: 1 days | End: 2022-03-08
Payer: MEDICARE

## 2022-03-08 DIAGNOSIS — R93.89 ABNORMAL FINDINGS ON DIAGNOSTIC IMAGING OF OTHER SPECIFIED BODY STRUCTURES: ICD-10-CM

## 2022-03-08 DIAGNOSIS — S62.102A FRACTURE OF UNSPECIFIED CARPAL BONE, LEFT WRIST, INITIAL ENCOUNTER FOR CLOSED FRACTURE: Chronic | ICD-10-CM

## 2022-03-08 DIAGNOSIS — S62.109A FRACTURE OF UNSPECIFIED CARPAL BONE, UNSPECIFIED WRIST, INITIAL ENCOUNTER FOR CLOSED FRACTURE: Chronic | ICD-10-CM

## 2022-03-08 DIAGNOSIS — Z98.51 TUBAL LIGATION STATUS: Chronic | ICD-10-CM

## 2022-03-08 PROCEDURE — G1004: CPT

## 2022-03-08 PROCEDURE — 71250 CT THORAX DX C-: CPT | Mod: 26,ME

## 2022-03-08 PROCEDURE — 71250 CT THORAX DX C-: CPT | Mod: ME

## 2022-03-16 ENCOUNTER — APPOINTMENT (OUTPATIENT)
Dept: PULMONOLOGY | Facility: CLINIC | Age: 77
End: 2022-03-16
Payer: MEDICARE

## 2022-03-16 VITALS
OXYGEN SATURATION: 99 % | SYSTOLIC BLOOD PRESSURE: 134 MMHG | HEART RATE: 69 BPM | TEMPERATURE: 97.2 F | RESPIRATION RATE: 16 BRPM | DIASTOLIC BLOOD PRESSURE: 78 MMHG

## 2022-03-16 LAB
POCT - HEMOGLOBIN (HGB), QUANTITATIVE, TRANSCUTANEOUS: 12.2
SARS-COV-2 AG RESP QL IA.RAPID: NEGATIVE

## 2022-03-16 PROCEDURE — 99213 OFFICE O/P EST LOW 20 MIN: CPT | Mod: 25

## 2022-03-16 PROCEDURE — 94726 PLETHYSMOGRAPHY LUNG VOLUMES: CPT

## 2022-03-16 PROCEDURE — 87811 SARS-COV-2 COVID19 W/OPTIC: CPT | Mod: QW

## 2022-03-16 PROCEDURE — ZZZZZ: CPT

## 2022-03-16 PROCEDURE — 94729 DIFFUSING CAPACITY: CPT

## 2022-03-16 PROCEDURE — 88738 HGB QUANT TRANSCUTANEOUS: CPT

## 2022-03-16 NOTE — HISTORY OF PRESENT ILLNESS
[Never] : never [TextBox_4] : JACQUELYN PRICE is a 76 year old female who presents for f/u on CT chest\par \par it was initially done 7/2021 for weight loss work up- some abnormalities noted\par here for result review of the repeat CT chest done 3/2022\par \par again w/o cough dyspnea wheezing\par no change in weight\par no toxin exposure\par no second hand exposure \par \par

## 2022-06-08 ENCOUNTER — APPOINTMENT (OUTPATIENT)
Dept: ENDOCRINOLOGY | Facility: CLINIC | Age: 77
End: 2022-06-08
Payer: MEDICARE

## 2022-06-08 VITALS
WEIGHT: 116 LBS | BODY MASS INDEX: 19.33 KG/M2 | RESPIRATION RATE: 16 BRPM | DIASTOLIC BLOOD PRESSURE: 78 MMHG | SYSTOLIC BLOOD PRESSURE: 120 MMHG | TEMPERATURE: 97 F | OXYGEN SATURATION: 98 % | HEART RATE: 72 BPM | HEIGHT: 65 IN

## 2022-06-08 PROCEDURE — 99213 OFFICE O/P EST LOW 20 MIN: CPT | Mod: 25

## 2022-06-08 PROCEDURE — 96372 THER/PROPH/DIAG INJ SC/IM: CPT

## 2022-06-08 RX ORDER — DENOSUMAB 60 MG/ML
60 INJECTION SUBCUTANEOUS
Qty: 1 | Refills: 0 | Status: COMPLETED | OUTPATIENT
Start: 2022-06-08

## 2022-06-08 RX ADMIN — DENOSUMAB 60 MG/ML: 60 INJECTION SUBCUTANEOUS at 00:00

## 2022-06-08 NOTE — ASSESSMENT
[Denosumab Therapy] : Risks  and benefits of denosumab therapy were discussed with the patient including eczema, cellulitis, osteonecrosis of the jaw and atypical femur fractures [FreeTextEntry1] : 75 year-old female with \par \par 1. Postmenopausal osteoporosis: Pt previously on long-term Fosamax but off therapy for approximately 4 years with moderate decline bone density. Additional risk factor of maternal h/o hip fx. Tolerated Prolia well since 2013. BMD 2014 and 2016 improved in spine. BMD 2/2019 indicates stable osteopenia in all sites except normal density in the proximal radius. On last visit in Aug 2019, pt was deemed stable and would benefit transitioning to Actonel to start a possible drug holiday following next f/u and BMD, to decrease risk of long term therapy side effects; ONJ or atypical femur fx. Started on Actonel Aug 2019. Tolerated well. BMD 11/2020 decreased spine. Options discussed. Restarted Prolia 11/2020. Tolerating well. No thigh pain, no interval fx. Normal Ca. No ONJ. BMD 12/2021 indicates stable osteoporosis in spine, stable osteopenia in total hip and proximal radius, but worsened osteopenia in femoral neck although suspect variability of the test. BMD results reviewed w/ pt. Continue Prolia, buy and bill.\par \par 2. H/o subclinical hypothyroidism: pt remains clinically euthyroid. No local neck pain. No dysphagia or dysphonia. No raciness, shakiness, heat/cold intolerance, tiredness, or fatigue. No palpitations, tremors, or sudden weight gain/loss.\par \par 3. Hemoglobin A1c 5.8% shows stable prediabetes. Natural history of prediabetes discussed in detail. Pt advised re: watching weight, maintaining moderate to low carbohydrate intake. Controversy concerning use of metformin for prediabetes reviewed.\par  \par   pt to repeat labs  w/ Dr. Anastasia Dang and to send me results.\par \par F/u in 6 months repeat BMD next visit

## 2022-06-08 NOTE — HISTORY OF PRESENT ILLNESS
[FreeTextEntry1] : No significant interval health changes. No interval surgery, hospitalizations, fractures, or change in medications.\par \par H/o distant wrist fx; fell ice-skating. Took Alendronate for 8-9 yrs, stopped for 4 year drug holiday. No add'l fx. +FHx hip fx mother. Began Prolia Nov 2013. Tolerated well. BMD in 2014 and 12//2016 improved in spine, stable in other sites. BMD 2/2019 indicates stable osteopenia in all sites except normal density in the proximal radius. Started on Actonel Aug 2019. Tolerated well. BMD 11/2020 decreased spine. Restarted Prolia 11/2020. Tolerating well. No thigh pain, no interval fx. Last DDS within past 6 months. No ONJ. \par \par Stable subclinical hypothyroidism. No local neck pain. No dysphagia or dysphonia. No raciness, shakiness, heat/cold intolerance, tiredness, or fatigue. No palpitations, tremors, or sudden weight gain/loss.\par \par H/o stable prediabetes.\par \par Started Prozac for anxiety.

## 2022-06-21 LAB
BASOPHILS # BLD AUTO: 0.03 K/UL
BASOPHILS NFR BLD AUTO: 0.5 %
EOSINOPHIL # BLD AUTO: 0.08 K/UL
EOSINOPHIL NFR BLD AUTO: 1.3 %
ESTIMATED AVERAGE GLUCOSE: 120 MG/DL
HBA1C MFR BLD HPLC: 5.8 %
HCT VFR BLD CALC: 40.7 %
HGB BLD-MCNC: 13.4 G/DL
IMM GRANULOCYTES NFR BLD AUTO: 0.5 %
LYMPHOCYTES # BLD AUTO: 1.63 K/UL
LYMPHOCYTES NFR BLD AUTO: 27.3 %
MAN DIFF?: NORMAL
MCHC RBC-ENTMCNC: 29.4 PG
MCHC RBC-ENTMCNC: 32.9 GM/DL
MCV RBC AUTO: 89.3 FL
MONOCYTES # BLD AUTO: 0.55 K/UL
MONOCYTES NFR BLD AUTO: 9.2 %
NEUTROPHILS # BLD AUTO: 3.64 K/UL
NEUTROPHILS NFR BLD AUTO: 61.2 %
PLATELET # BLD AUTO: 192 K/UL
RBC # BLD: 4.56 M/UL
RBC # FLD: 13.5 %
TSH SERPL-ACNC: 4.04 UIU/ML
WBC # FLD AUTO: 5.96 K/UL

## 2022-06-22 LAB
ALBUMIN SERPL ELPH-MCNC: 4.4 G/DL
ALP BLD-CCNC: 45 U/L
ALT SERPL-CCNC: 25 U/L
ANION GAP SERPL CALC-SCNC: 13 MMOL/L
AST SERPL-CCNC: 27 U/L
BILIRUB SERPL-MCNC: 0.4 MG/DL
BUN SERPL-MCNC: 16 MG/DL
CALCIUM SERPL-MCNC: 9.7 MG/DL
CHLORIDE SERPL-SCNC: 103 MMOL/L
CHOLEST SERPL-MCNC: 184 MG/DL
CO2 SERPL-SCNC: 25 MMOL/L
CREAT SERPL-MCNC: 0.8 MG/DL
EGFR: 77 ML/MIN/1.73M2
GLUCOSE SERPL-MCNC: 86 MG/DL
HDLC SERPL-MCNC: 78 MG/DL
LDLC SERPL CALC-MCNC: 90 MG/DL
NONHDLC SERPL-MCNC: 105 MG/DL
POTASSIUM SERPL-SCNC: 5 MMOL/L
PROT SERPL-MCNC: 7 G/DL
SODIUM SERPL-SCNC: 141 MMOL/L
TRIGL SERPL-MCNC: 77 MG/DL

## 2022-07-05 ENCOUNTER — APPOINTMENT (OUTPATIENT)
Dept: INTERNAL MEDICINE | Facility: CLINIC | Age: 77
End: 2022-07-05

## 2022-07-05 VITALS
TEMPERATURE: 97.6 F | OXYGEN SATURATION: 97 % | BODY MASS INDEX: 19.33 KG/M2 | DIASTOLIC BLOOD PRESSURE: 74 MMHG | WEIGHT: 116 LBS | HEART RATE: 76 BPM | SYSTOLIC BLOOD PRESSURE: 137 MMHG | HEIGHT: 65 IN

## 2022-07-05 PROCEDURE — 99214 OFFICE O/P EST MOD 30 MIN: CPT

## 2022-07-05 RX ORDER — MONTELUKAST 10 MG/1
10 TABLET, FILM COATED ORAL
Qty: 90 | Refills: 1 | Status: DISCONTINUED | COMMUNITY
Start: 2020-07-07 | End: 2022-07-05

## 2022-07-05 RX ORDER — FLUOXETINE HCL 10 MG
TABLET ORAL
Refills: 0 | Status: DISCONTINUED | COMMUNITY
End: 2022-07-05

## 2022-07-05 NOTE — HEALTH RISK ASSESSMENT
[Very Good] : ~his/her~ current health as very good [Good] : ~his/her~  mood as  good [Never] : Never [No] : No [No falls in past year] : Patient reported no falls in the past year [0] : 2) Feeling down, depressed, or hopeless: Not at all (0) [No Retinopathy] : No retinopathy [Patient reported mammogram was normal] : Patient reported mammogram was normal [Patient reported colonoscopy was normal] : Patient reported colonoscopy was normal [HIV test declined] : HIV test declined [Hepatitis C test declined] : Hepatitis C test declined [None] : None [With Significant Other] : lives with significant other [Retired] : retired [] :  [Fully functional (bathing, dressing, toileting, transferring, walking, feeding)] : Fully functional (bathing, dressing, toileting, transferring, walking, feeding) [Fully functional (using the telephone, shopping, preparing meals, housekeeping, doing laundry, using] : Fully functional and needs no help or supervision to perform IADLs (using the telephone, shopping, preparing meals, housekeeping, doing laundry, using transportation, managing medications and managing finances) [Smoke Detector] : smoke detector [Carbon Monoxide Detector] : carbon monoxide detector [Seat Belt] :  uses seat belt [Sunscreen] : uses sunscreen [With Patient/Caregiver] : , with patient/caregiver [Name: ___] : Health Care Proxy's Name: [unfilled]  [Relationship: ___] : Relationship: [unfilled] [[facility]] : 3 [EyeExamDate] : 5/22 [Guns at Home] : no guns at home [Travel to Developing Areas] : does not  travel to developing areas [TB Exposure] : is not being exposed to tuberculosis [Caregiver Concerns] : does not have caregiver concerns [MammogramDate] : 1/27/22 [BoneDensityDate] : 12/7/22 [ColonoscopyDate] : 2019 [ColonoscopyComments] : due 2024 [AdvancecareDate] : 7.5.22

## 2022-07-05 NOTE — ASSESSMENT
[FreeTextEntry1] : 1. wt loss: Had a full evaluation wt is stable now  \par 2. Protonix 40  completed \par 3 .Anxiety : off Paxil , but now on prozac 20  per GI and feeling better , has an appointment with psych in the future  , hard to find one \par 4 choles : 189//73/102/78  to 184/78/9077 controlled \par 5. hyperglycemia : a1c = 5.8 in the past and now 5.8 again and controlle \par 6 . prev med up to date, received 3   , had colon next 2024 , gyn, \par 7 OP on Prolia per endo \par 30 n min

## 2022-07-05 NOTE — PHYSICAL EXAM
[Normal] : affect was normal and insight and judgment were intact [de-identified] : ruma resendiz

## 2022-07-05 NOTE — HISTORY OF PRESENT ILLNESS
[FreeTextEntry1] : here for annual full PE, wt is stable  [de-identified] : Feels well, no complaints \par Prozac clearly controls her anxiety

## 2022-09-12 ENCOUNTER — NON-APPOINTMENT (OUTPATIENT)
Age: 77
End: 2022-09-12

## 2022-12-23 ENCOUNTER — APPOINTMENT (OUTPATIENT)
Dept: ENDOCRINOLOGY | Facility: CLINIC | Age: 77
End: 2022-12-23

## 2022-12-28 ENCOUNTER — APPOINTMENT (OUTPATIENT)
Dept: ENDOCRINOLOGY | Facility: CLINIC | Age: 77
End: 2022-12-28

## 2023-01-03 LAB
ALBUMIN SERPL ELPH-MCNC: 4.3 G/DL
ALP BLD-CCNC: 48 U/L
ALT SERPL-CCNC: 32 U/L
ANION GAP SERPL CALC-SCNC: 8 MMOL/L
AST SERPL-CCNC: 29 U/L
BASOPHILS # BLD AUTO: 0.04 K/UL
BASOPHILS NFR BLD AUTO: 0.7 %
BILIRUB SERPL-MCNC: 0.4 MG/DL
BUN SERPL-MCNC: 19 MG/DL
CALCIUM SERPL-MCNC: 9.5 MG/DL
CHLORIDE SERPL-SCNC: 103 MMOL/L
CHOLEST SERPL-MCNC: 188 MG/DL
CO2 SERPL-SCNC: 29 MMOL/L
CREAT SERPL-MCNC: 0.73 MG/DL
EGFR: 85 ML/MIN/1.73M2
EOSINOPHIL # BLD AUTO: 0.07 K/UL
EOSINOPHIL NFR BLD AUTO: 1.3 %
ESTIMATED AVERAGE GLUCOSE: 120 MG/DL
GLUCOSE SERPL-MCNC: 90 MG/DL
HBA1C MFR BLD HPLC: 5.8 %
HCT VFR BLD CALC: 39.3 %
HDLC SERPL-MCNC: 79 MG/DL
HGB BLD-MCNC: 12.6 G/DL
IMM GRANULOCYTES NFR BLD AUTO: 0.6 %
LDLC SERPL CALC-MCNC: 96 MG/DL
LYMPHOCYTES # BLD AUTO: 1.64 K/UL
LYMPHOCYTES NFR BLD AUTO: 30.1 %
MAN DIFF?: NORMAL
MCHC RBC-ENTMCNC: 29.1 PG
MCHC RBC-ENTMCNC: 32.1 GM/DL
MCV RBC AUTO: 90.8 FL
MONOCYTES # BLD AUTO: 0.71 K/UL
MONOCYTES NFR BLD AUTO: 13 %
NEUTROPHILS # BLD AUTO: 2.96 K/UL
NEUTROPHILS NFR BLD AUTO: 54.3 %
NONHDLC SERPL-MCNC: 108 MG/DL
PLATELET # BLD AUTO: 226 K/UL
POTASSIUM SERPL-SCNC: 4.4 MMOL/L
PROT SERPL-MCNC: 6.8 G/DL
RBC # BLD: 4.33 M/UL
RBC # FLD: 13.7 %
SODIUM SERPL-SCNC: 139 MMOL/L
TRIGL SERPL-MCNC: 64 MG/DL
TSH SERPL-ACNC: 4.3 UIU/ML
WBC # FLD AUTO: 5.45 K/UL

## 2023-01-10 ENCOUNTER — APPOINTMENT (OUTPATIENT)
Dept: INTERNAL MEDICINE | Facility: CLINIC | Age: 78
End: 2023-01-10
Payer: MEDICARE

## 2023-01-10 ENCOUNTER — TRANSCRIPTION ENCOUNTER (OUTPATIENT)
Age: 78
End: 2023-01-10

## 2023-01-10 VITALS
BODY MASS INDEX: 19.66 KG/M2 | HEART RATE: 69 BPM | OXYGEN SATURATION: 97 % | TEMPERATURE: 97.7 F | DIASTOLIC BLOOD PRESSURE: 87 MMHG | WEIGHT: 118 LBS | SYSTOLIC BLOOD PRESSURE: 154 MMHG | HEIGHT: 65 IN

## 2023-01-10 PROCEDURE — 99214 OFFICE O/P EST MOD 30 MIN: CPT

## 2023-01-10 NOTE — ASSESSMENT
[FreeTextEntry1] : 1. wt loss: Had a full evaluation wt is stable now  ,gained 2 lbs over the holiday \par 2. Protonix 40  completed \par 3 .Anxiety : off Paxil , but now on prozac 20  per GI and feeling better , has an appointment with psych in the future still has not seen, will refer to [facility] \par 4 choles : 189//73/102/78  to 184/78/9077 188/79/96  controlled \par 5. hyperglycemia : a1c = 5.8 in the past and now 5.8 again and controlled \par 5.5 abnormal CT followed by Pulm, and to repeat the CT after her March visit  in 3-4/23 \par 6 . prev med up to date, received x 4 covid to get the latest booster, received flu   , had colon next 2024 , gyn, \par 7 OP on Prolia per endo \par renew  prozac and atorvastain and follow up in with full PE in 6 mos

## 2023-01-10 NOTE — HISTORY OF PRESENT ILLNESS
[FreeTextEntry1] : here for follow up for her HLD , hyperglycemia  and anxiety  [de-identified] : Doing well., no complaints , still has some anx but much improved  on the prozac

## 2023-01-13 ENCOUNTER — NON-APPOINTMENT (OUTPATIENT)
Age: 78
End: 2023-01-13

## 2023-02-01 ENCOUNTER — APPOINTMENT (OUTPATIENT)
Dept: ENDOCRINOLOGY | Facility: CLINIC | Age: 78
End: 2023-02-01
Payer: MEDICARE

## 2023-02-01 ENCOUNTER — APPOINTMENT (OUTPATIENT)
Dept: ENDOCRINOLOGY | Facility: CLINIC | Age: 78
End: 2023-02-01

## 2023-02-01 VITALS — BODY MASS INDEX: 20.08 KG/M2 | HEIGHT: 65.2 IN | WEIGHT: 122 LBS

## 2023-02-01 PROCEDURE — 77080 DXA BONE DENSITY AXIAL: CPT

## 2023-02-01 PROCEDURE — 96372 THER/PROPH/DIAG INJ SC/IM: CPT

## 2023-02-01 RX ADMIN — DENOSUMAB 60 MG/ML: 60 INJECTION SUBCUTANEOUS at 00:00

## 2023-02-02 RX ORDER — DENOSUMAB 60 MG/ML
60 INJECTION SUBCUTANEOUS
Qty: 1 | Refills: 0 | Status: COMPLETED | OUTPATIENT
Start: 2023-02-01

## 2023-02-13 ENCOUNTER — TRANSCRIPTION ENCOUNTER (OUTPATIENT)
Age: 78
End: 2023-02-13

## 2023-02-15 ENCOUNTER — TRANSCRIPTION ENCOUNTER (OUTPATIENT)
Age: 78
End: 2023-02-15

## 2023-02-28 ENCOUNTER — TRANSCRIPTION ENCOUNTER (OUTPATIENT)
Age: 78
End: 2023-02-28

## 2023-03-15 ENCOUNTER — APPOINTMENT (OUTPATIENT)
Dept: PULMONOLOGY | Facility: CLINIC | Age: 78
End: 2023-03-15
Payer: MEDICARE

## 2023-03-15 VITALS
HEART RATE: 75 BPM | DIASTOLIC BLOOD PRESSURE: 73 MMHG | SYSTOLIC BLOOD PRESSURE: 152 MMHG | OXYGEN SATURATION: 100 % | RESPIRATION RATE: 16 BRPM

## 2023-03-15 PROCEDURE — 94010 BREATHING CAPACITY TEST: CPT

## 2023-03-15 PROCEDURE — 99214 OFFICE O/P EST MOD 30 MIN: CPT | Mod: 25

## 2023-03-15 NOTE — PHYSICAL EXAM
[No Acute Distress] : no acute distress [Well Nourished] : well nourished [Normal Rate/Rhythm] : normal rate/rhythm [Normal S1, S2] : normal s1, s2 [No Acc Muscle Use] : no acc muscle use [No Resp Distress] : no resp distress [Clear to Auscultation Bilaterally] : clear to auscultation bilaterally [No Focal Deficits] : no focal deficits [Oriented x3] : oriented x3

## 2023-03-15 NOTE — HISTORY OF PRESENT ILLNESS
[Never] : never [TextBox_4] : JACQUELYN PRICE is a 77 year old female who presents for f/u \par last seen 1 year ago\par doing ok\par no resp complaints\par \par she has an abnormal cT chest that was done for weight loss workup\par ct chest- 2021 and 2022\par \par  \par \par

## 2023-03-15 NOTE — PROCEDURE
[FreeTextEntry1] : normal spirometery- 3/2023\par \par previous data:\par normal gurpreet, volume and dlco\par \par CT chest 7/2021\par Ct chest 3/022

## 2023-03-22 ENCOUNTER — TRANSCRIPTION ENCOUNTER (OUTPATIENT)
Age: 78
End: 2023-03-22

## 2023-03-22 ENCOUNTER — OUTPATIENT (OUTPATIENT)
Dept: OUTPATIENT SERVICES | Facility: HOSPITAL | Age: 78
LOS: 1 days | End: 2023-03-22
Payer: MEDICARE

## 2023-03-22 ENCOUNTER — APPOINTMENT (OUTPATIENT)
Dept: CT IMAGING | Facility: IMAGING CENTER | Age: 78
End: 2023-03-22
Payer: MEDICARE

## 2023-03-22 DIAGNOSIS — S62.109A FRACTURE OF UNSPECIFIED CARPAL BONE, UNSPECIFIED WRIST, INITIAL ENCOUNTER FOR CLOSED FRACTURE: Chronic | ICD-10-CM

## 2023-03-22 DIAGNOSIS — Z98.51 TUBAL LIGATION STATUS: Chronic | ICD-10-CM

## 2023-03-22 DIAGNOSIS — S62.102A FRACTURE OF UNSPECIFIED CARPAL BONE, LEFT WRIST, INITIAL ENCOUNTER FOR CLOSED FRACTURE: Chronic | ICD-10-CM

## 2023-03-22 DIAGNOSIS — R93.89 ABNORMAL FINDINGS ON DIAGNOSTIC IMAGING OF OTHER SPECIFIED BODY STRUCTURES: ICD-10-CM

## 2023-03-22 PROCEDURE — 71250 CT THORAX DX C-: CPT | Mod: 26,MH

## 2023-03-22 PROCEDURE — 71250 CT THORAX DX C-: CPT

## 2023-03-26 ENCOUNTER — NON-APPOINTMENT (OUTPATIENT)
Age: 78
End: 2023-03-26

## 2023-04-03 ENCOUNTER — TRANSCRIPTION ENCOUNTER (OUTPATIENT)
Age: 78
End: 2023-04-03

## 2023-04-18 ENCOUNTER — LABORATORY RESULT (OUTPATIENT)
Age: 78
End: 2023-04-18

## 2023-04-18 ENCOUNTER — APPOINTMENT (OUTPATIENT)
Dept: GASTROENTEROLOGY | Facility: CLINIC | Age: 78
End: 2023-04-18
Payer: MEDICARE

## 2023-04-18 VITALS
BODY MASS INDEX: 19.29 KG/M2 | WEIGHT: 120 LBS | SYSTOLIC BLOOD PRESSURE: 100 MMHG | OXYGEN SATURATION: 99 % | DIASTOLIC BLOOD PRESSURE: 60 MMHG | HEIGHT: 66 IN | TEMPERATURE: 97.1 F | HEART RATE: 74 BPM

## 2023-04-18 DIAGNOSIS — R11.2 NAUSEA WITH VOMITING, UNSPECIFIED: ICD-10-CM

## 2023-04-18 PROCEDURE — 99214 OFFICE O/P EST MOD 30 MIN: CPT

## 2023-04-20 LAB
ENDOMYSIUM IGA SER QL: NEGATIVE
ENDOMYSIUM IGA TITR SER: NORMAL
GLIADIN IGA SER QL: 5.6 UNITS
GLIADIN IGG SER QL: <5 UNITS
GLIADIN PEPTIDE IGA SER-ACNC: NEGATIVE
GLIADIN PEPTIDE IGG SER-ACNC: NEGATIVE
IGA SER QL IEP: 217 MG/DL
TTG IGA SER IA-ACNC: <1.2 U/ML
TTG IGA SER-ACNC: NEGATIVE
TTG IGG SER IA-ACNC: 4 U/ML
TTG IGG SER IA-ACNC: NEGATIVE

## 2023-04-20 NOTE — ASSESSMENT
[FreeTextEntry1] : Patient doing generally well with episodes of nausea and vomiting occurring every 2 to 3 months.  She states that taking pantoprazole 20 mg for few days during these episodes helps her.  She also feels that being on a mostly gluten-free and lactose-free diet has helped her.\par \par Advised that it is okay to continue pantoprazole 20 mg on a as needed basis.  This was renewed.\par \par Patient will go for blood work for celiac serologies.\par \par Patient is due for colonoscopy in 2024.\par \par Patient will return to see me in 1 year or sooner if needed.

## 2023-04-20 NOTE — HISTORY OF PRESENT ILLNESS
[FreeTextEntry1] : The patient was a patient of Dr. Weiss, who has retired.  The patient is now seeing me to transfer care. We spent some time reviewing the patient's medical history.\par \par The patient is a 77-year-old woman who last saw Dr. Weiss on January 24, 2022.  He had performed EGD on August 10, 2021 which was normal with negative biopsies.  The patient had undergone a normal colonoscopy by Dr. Isidro on February 15, 2019.  She gets occasional episodes of nausea and vomiting occurring every 2 to 3 months "if something does not agree with me".  She takes pantoprazole 20 mg a day for a few days at a time approximately every 2 months when she has these episodes.  Otherwise, she denies heartburn, dysphagia, abdominal pain.  She has a solid bowel movement every 2 to 3 days with occasional loose stools.  Denies melena or bright red blood per rectum.  The patient's weight is stable.  She is on a mostly gluten-free and lactose-free diet, which she states helps her.  Duodenal biopsies were negative on the endoscopy when she was not gluten-free.  The patient has not been admitted to the hospital in the past year and denies any cardiac issues.

## 2023-04-20 NOTE — CONSULT LETTER
[FreeTextEntry1] : Dear Dr. Anastasia Dang,\par \par I had the pleasure of seeing your patient JACQUELYN PRICE in the office today.  My office note is attached. PLEASE READ THE "ASSESSMENT" SECTION OF THE NOTE TO SEE MY IMPRESSION AND PLAN.\par \par Thank you very much for allowing me to participate in the care of your patient.\par \par Sincerely,\par \par Alexandro Ibarra M.D., FAC, FACP\par Director, Celiac Program at [facility]\par  of Medicine, [employer] at hospitals/[facility]\par Adjunct  of Medicine, [facility] Practice Director, [facility] - Gastroenterology at [place]\[facility] [address]\[place] [address]\par Tel: [phone]\par Email: [email or web address]\par \par \par The attached note has been created using a voice recognition system (Dragon).  There may be some misspellings and typos.  Please call my office if you have any issues or questions.

## 2023-04-24 ENCOUNTER — TRANSCRIPTION ENCOUNTER (OUTPATIENT)
Age: 78
End: 2023-04-24

## 2023-04-25 ENCOUNTER — NON-APPOINTMENT (OUTPATIENT)
Age: 78
End: 2023-04-25

## 2023-07-03 ENCOUNTER — LABORATORY RESULT (OUTPATIENT)
Age: 78
End: 2023-07-03

## 2023-07-03 LAB
ALBUMIN SERPL ELPH-MCNC: 4.2 G/DL
ALP BLD-CCNC: 49 U/L
ALT SERPL-CCNC: 22 U/L
ANION GAP SERPL CALC-SCNC: 10 MMOL/L
AST SERPL-CCNC: 25 U/L
BILIRUB SERPL-MCNC: 0.3 MG/DL
BUN SERPL-MCNC: 21 MG/DL
CALCIUM SERPL-MCNC: 9.4 MG/DL
CHLORIDE SERPL-SCNC: 105 MMOL/L
CHOLEST SERPL-MCNC: 163 MG/DL
CO2 SERPL-SCNC: 27 MMOL/L
CREAT SERPL-MCNC: 0.76 MG/DL
EGFR: 81 ML/MIN/1.73M2
ESTIMATED AVERAGE GLUCOSE: 123 MG/DL
GLUCOSE SERPL-MCNC: 96 MG/DL
HBA1C MFR BLD HPLC: 5.9 %
HDLC SERPL-MCNC: 67 MG/DL
LDLC SERPL CALC-MCNC: 84 MG/DL
NONHDLC SERPL-MCNC: 96 MG/DL
POTASSIUM SERPL-SCNC: 4.3 MMOL/L
PROT SERPL-MCNC: 6.8 G/DL
SODIUM SERPL-SCNC: 142 MMOL/L
TRIGL SERPL-MCNC: 61 MG/DL
TSH SERPL-ACNC: 4.29 UIU/ML

## 2023-07-10 ENCOUNTER — RX RENEWAL (OUTPATIENT)
Age: 78
End: 2023-07-10

## 2023-07-13 ENCOUNTER — APPOINTMENT (OUTPATIENT)
Dept: INTERNAL MEDICINE | Facility: CLINIC | Age: 78
End: 2023-07-13

## 2023-07-13 ENCOUNTER — APPOINTMENT (OUTPATIENT)
Dept: INTERNAL MEDICINE | Facility: CLINIC | Age: 78
End: 2023-07-13
Payer: MEDICARE

## 2023-07-13 VITALS
SYSTOLIC BLOOD PRESSURE: 120 MMHG | WEIGHT: 122 LBS | HEART RATE: 76 BPM | HEIGHT: 66 IN | RESPIRATION RATE: 15 BRPM | OXYGEN SATURATION: 98 % | TEMPERATURE: 97 F | BODY MASS INDEX: 19.61 KG/M2 | DIASTOLIC BLOOD PRESSURE: 67 MMHG

## 2023-07-13 DIAGNOSIS — Z00.00 ENCOUNTER FOR GENERAL ADULT MEDICAL EXAMINATION W/OUT ABNORMAL FINDINGS: ICD-10-CM

## 2023-07-13 DIAGNOSIS — H54.40 BLINDNESS, ONE EYE, UNSPECIFIED EYE: ICD-10-CM

## 2023-07-13 DIAGNOSIS — Z87.898 PERSONAL HISTORY OF OTHER SPECIFIED CONDITIONS: ICD-10-CM

## 2023-07-13 DIAGNOSIS — R04.0 EPISTAXIS: ICD-10-CM

## 2023-07-13 DIAGNOSIS — R19.5 OTHER FECAL ABNORMALITIES: ICD-10-CM

## 2023-07-13 DIAGNOSIS — R63.4 ABNORMAL WEIGHT LOSS: ICD-10-CM

## 2023-07-13 DIAGNOSIS — R93.89 ABNORMAL FINDINGS ON DIAGNOSTIC IMAGING OF OTHER SPECIFIED BODY STRUCTURES: ICD-10-CM

## 2023-07-13 DIAGNOSIS — R73.09 OTHER ABNORMAL GLUCOSE: ICD-10-CM

## 2023-07-13 PROCEDURE — G0439: CPT

## 2023-07-13 NOTE — PHYSICAL EXAM
[No Acute Distress] : no acute distress [Normal Voice/Communication] : normal voice/communication [No Carotid Bruits] : no carotid bruits [Pedal Pulses Present] : the pedal pulses are present [No Edema] : there was no peripheral edema [Coordination Grossly Intact] : coordination grossly intact [No Focal Deficits] : no focal deficits [Normal Gait] : normal gait [Normal] : affect was normal and insight and judgment were intact

## 2023-07-13 NOTE — HEALTH RISK ASSESSMENT
[Good] : ~his/her~  mood as  good [No] : In the past 12 months have you used drugs other than those required for medical reasons? No [No falls in past year] : Patient reported no falls in the past year [0] : 2) Feeling down, depressed, or hopeless: Not at all (0) [PHQ-2 Negative - No further assessment needed] : PHQ-2 Negative - No further assessment needed [With Significant Other] : lives with significant other [] :  [Fully functional (bathing, dressing, toileting, transferring, walking, feeding)] : Fully functional (bathing, dressing, toileting, transferring, walking, feeding) [Fully functional (using the telephone, shopping, preparing meals, housekeeping, doing laundry, using] : Fully functional and needs no help or supervision to perform IADLs (using the telephone, shopping, preparing meals, housekeeping, doing laundry, using transportation, managing medications and managing finances) [Smoke Detector] : smoke detector [Carbon Monoxide Detector] : carbon monoxide detector [Sunscreen] : uses sunscreen [Never] : Never [de-identified] : lots  [de-identified] : regular  [[id number]] : 0 [Reports changes in hearing] : Reports no changes in hearing [Reports changes in vision] : Reports no changes in vision [Reports changes in dental health] : Reports no changes in dental health [Guns at Home] : no guns at home [MammogramDate] : 03/2023 [PapSmearDate] : 04/2021 [de-identified] : blind right eye x 22 yr., watching cataract in the left

## 2023-07-13 NOTE — ASSESSMENT
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  \par \par H/o BCC - last in 2021, continues derm f/u every 6 months.  \par \par HM:\par Breast cancer screening - mammo + sono 3/2023, normal \par CRS - colonoscopy 2019, repeat 5 yr - next year\par Skin cancer screening - every 6 months (h/o basal cell) \par Bone density earlier this year with improvement, continues prolia \par Cervical cancer screening - pap 2021, normal \par Dental UTD - no issues \par tdap UTD - 2017\par Pneumococcal vaccination done \par Shingrix vaccine done \par COVID vaccine done with boosters x 2 \par \par check labs\par \par

## 2023-07-13 NOTE — HISTORY OF PRESENT ILLNESS
[de-identified] : 77 y.o. 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  Feeling well, no concerns.  \par \par Workup for weight loss 2 years ago with some chest CT abnormalities, now with 2 year stability of CT.  Has gained the weight back.  \par \par Continues Prolia with endo for OP.  Tries to do drug holiday, but worsening.  Continues Endo follow up every 6 months.  \par \par Still with episodic stomach upset and diarrhea about every 8 weeks.   Does bland, mostly liquid diet with pantoprazole x 2-3 days and resolves.  \par \par MRI brain every 18 months for meningioma, followed at [facility] as well as by optic neurologist.  \par \par H/o BCC - last in 2021, continues derm f/u every 6 months.  \par

## 2023-08-03 ENCOUNTER — NON-APPOINTMENT (OUTPATIENT)
Age: 78
End: 2023-08-03

## 2023-08-08 ENCOUNTER — APPOINTMENT (OUTPATIENT)
Dept: ENDOCRINOLOGY | Facility: CLINIC | Age: 78
End: 2023-08-08
Payer: MEDICARE

## 2023-08-08 VITALS
HEIGHT: 66 IN | OXYGEN SATURATION: 98 % | WEIGHT: 122 LBS | HEART RATE: 74 BPM | BODY MASS INDEX: 19.61 KG/M2 | DIASTOLIC BLOOD PRESSURE: 74 MMHG | SYSTOLIC BLOOD PRESSURE: 110 MMHG

## 2023-08-08 PROCEDURE — 96372 THER/PROPH/DIAG INJ SC/IM: CPT

## 2023-08-08 PROCEDURE — 99214 OFFICE O/P EST MOD 30 MIN: CPT | Mod: 25

## 2023-08-08 RX ORDER — DENOSUMAB 60 MG/ML
60 INJECTION SUBCUTANEOUS
Qty: 1 | Refills: 0 | Status: COMPLETED | OUTPATIENT
Start: 2023-08-08

## 2023-08-08 RX ADMIN — DENOSUMAB 60 MG/ML: 60 INJECTION SUBCUTANEOUS at 00:00

## 2023-08-09 NOTE — ASSESSMENT
[Denosumab Therapy] : Risks  and benefits of denosumab therapy were discussed with the patient including eczema, cellulitis, osteonecrosis of the jaw and atypical femur fractures [FreeTextEntry1] : 77year-old female with   1. Postmenopausal osteoporosis: Pt previously on long-term Fosamax but off therapy for approximately 4 years wit h moderate decline bone density. Additional risk factor of maternal h/o hip fx. Tolerated Prolia well since 2013. BMD 2014 and 2016 improved in spine. BMD 2/2019 indicates stable osteopenia in all sites except normal density in the proximal radius. On last visit in Aug 2019, pt was deemed stable and would benefit transitioning to Actonel to start a possible drug holiday following next f/u and BMD, to decrease risk of long term therapy side effects; ONJ or atypical femur fx. Started on Actonel Aug 2019. Tolerated well. BMD 11/2020 decreased spine. Options discussed. Restarted Prolia 11/2020. Tolerating well. No thigh pain, no interval fx. Normal Ca. No ONJ.  Bone density February 2023 increased. . BMD results reviewed w/ pt. Continue Prolia, buy and bill.  2. H/o subclinical hypothyroidism: pt remains clinically euthyroid. No local neck pain. No dysphagia or dysphonia. No raciness, shakiness, heat/cold intolerance, tiredness, or fatigue. No palpitations, tremors, or sudden weight gain/loss.  3. Hemoglobin A1c 5.8% shows stable prediabetes. Natural history of prediabetes discussed in detail. Pt advised re: watching weight, maintaining moderate to low carbohydrate intake. Controversy concerning use of metformin for prediabetes reviewed.     pt to repeat labs  w/ Dr. Anastasia Dang and to send me results.  F/u in 6 months

## 2023-08-09 NOTE — PROCEDURE
[FreeTextEntry1] : Exam Date: February 1, 2023 Indication: Compared to 2021 assess response to medication Lumbar spine 1-4   BMD: 0.816 g/cm2    T score -2.1    WHO Classification osteopenia +11.2% Total hip      BMD: 0.701 g/cm2    T score -2.0    WHO Classification osteopenia, no significant change Femoral neck    BMD: 0.656 g/cm2    T score -1.7    WHO Classification osteopenia, +4.6%     Proximal wrist    BMD: 0.635 g/cm2    T score -1.0    WHO Classification normal no significant change    Bone mineral density: 12/07/2021  Indication: vs. 2020 prior test showed bone loss Spine: -2.8 osteoporosis, no significant change Total hip: -2.0 osteopenia, no significant change Femoral neck: -2.0 osteopenia, -5.8%, suspect variability of the test Proximal radius: -1.1 osteopenia, no significant change  Bone mineral density 11/5/20 indication: vs 2019 change in Rx assess response to medication  spine -2.7 osteoporosis -4.7% total hip -2.0 osteopenia no significant change femoral neck -1.6 osteopenia no significant change proximal radius -1.3 osteopenia no significant change  Bone mineral density: 02/20/2019  Indication: vs. 2016  Spine: -2.4 osteopenia, no significant change  Total hip: -1.8 osteopenia, no significant change  Femoral neck: -1.7 osteopenia, no significant change  Proximal radius: -1.0 normal, no significant change   Bone mineral density test December 20, 2016 Two-year study Spine -2.4, osteopenia, +3.8% versus 2014 Total hip - 1.8, osteopenia, no significant change Femoral neck -1.9, osteopenia, no significant change Proximal radius -1.2, osteopenia, no significant change  bone density December one, 2014 Spine -2.6,  osteoporosis, improved versus prior study -2.8,  2013 Total hip -1.8, osteopenia, stable Femoral neck, -2.0, osteopenia, no prior result Partial radius -1.0, normal, no prior result

## 2023-09-01 ENCOUNTER — TRANSCRIPTION ENCOUNTER (OUTPATIENT)
Age: 78
End: 2023-09-01

## 2023-12-14 ENCOUNTER — APPOINTMENT (OUTPATIENT)
Dept: OBGYN | Facility: CLINIC | Age: 78
End: 2023-12-14
Payer: MEDICARE

## 2023-12-14 VITALS — DIASTOLIC BLOOD PRESSURE: 68 MMHG | SYSTOLIC BLOOD PRESSURE: 110 MMHG | WEIGHT: 123 LBS | BODY MASS INDEX: 19.85 KG/M2

## 2023-12-14 VITALS — WEIGHT: 123 LBS | HEIGHT: 66 IN | BODY MASS INDEX: 19.77 KG/M2

## 2023-12-14 DIAGNOSIS — Z01.419 ENCOUNTER FOR GYNECOLOGICAL EXAMINATION (GENERAL) (ROUTINE) W/OUT ABNORMAL FINDINGS: ICD-10-CM

## 2023-12-14 DIAGNOSIS — Z00.00 ENCOUNTER FOR GENERAL ADULT MEDICAL EXAMINATION W/OUT ABNORMAL FINDINGS: ICD-10-CM

## 2023-12-14 PROCEDURE — G0101: CPT

## 2023-12-14 NOTE — HISTORY OF PRESENT ILLNESS
[FreeTextEntry1] : 79yo  P3 PM  here for annual exam /p JUSTICE w salpingecotmy s/p sling  no gyn complaints

## 2023-12-14 NOTE — PHYSICAL EXAM
[Chaperone Declined] : Patient declined chaperone [Appropriately responsive] : appropriately responsive [Alert] : alert [No Acute Distress] : no acute distress [No Lymphadenopathy] : no lymphadenopathy [Soft] : soft [Non-tender] : non-tender [Non-distended] : non-distended [No HSM] : No HSM [No Lesions] : no lesions [No Mass] : no mass [Oriented x3] : oriented x3 [Examination Of The Breasts] : a normal appearance [No Masses] : no breast masses were palpable [Labia Majora] : normal [Labia Minora] : normal [Atrophy] : atrophy [Normal] : normal [Absent] : absent [Uterine Adnexae] : normal

## 2023-12-18 LAB — CYTOLOGY CVX/VAG DOC THIN PREP: ABNORMAL

## 2024-01-05 LAB
ALBUMIN SERPL ELPH-MCNC: 3.8 G/DL
ALP BLD-CCNC: 42 U/L
ALT SERPL-CCNC: 34 U/L
ANION GAP SERPL CALC-SCNC: 10 MMOL/L
AST SERPL-CCNC: 30 U/L
BILIRUB SERPL-MCNC: 0.3 MG/DL
BUN SERPL-MCNC: 15 MG/DL
CALCIUM SERPL-MCNC: 9.1 MG/DL
CHLORIDE SERPL-SCNC: 105 MMOL/L
CHOLEST SERPL-MCNC: 143 MG/DL
CO2 SERPL-SCNC: 28 MMOL/L
CREAT SERPL-MCNC: 0.71 MG/DL
EGFR: 88 ML/MIN/1.73M2
ESTIMATED AVERAGE GLUCOSE: 117 MG/DL
GLUCOSE SERPL-MCNC: 88 MG/DL
HBA1C MFR BLD HPLC: 5.7 %
HDLC SERPL-MCNC: 57 MG/DL
LDLC SERPL CALC-MCNC: 75 MG/DL
NONHDLC SERPL-MCNC: 86 MG/DL
POTASSIUM SERPL-SCNC: 4.2 MMOL/L
PROT SERPL-MCNC: 6.4 G/DL
SODIUM SERPL-SCNC: 143 MMOL/L
TRIGL SERPL-MCNC: 51 MG/DL
TSH SERPL-ACNC: 2.64 UIU/ML

## 2024-01-16 ENCOUNTER — APPOINTMENT (OUTPATIENT)
Dept: INTERNAL MEDICINE | Facility: CLINIC | Age: 79
End: 2024-01-16
Payer: MEDICARE

## 2024-01-16 VITALS
TEMPERATURE: 98.2 F | HEART RATE: 69 BPM | WEIGHT: 124 LBS | SYSTOLIC BLOOD PRESSURE: 154 MMHG | BODY MASS INDEX: 19.93 KG/M2 | DIASTOLIC BLOOD PRESSURE: 80 MMHG | OXYGEN SATURATION: 97 % | HEIGHT: 66 IN

## 2024-01-16 DIAGNOSIS — Z23 ENCOUNTER FOR IMMUNIZATION: ICD-10-CM

## 2024-01-16 DIAGNOSIS — R10.13 EPIGASTRIC PAIN: ICD-10-CM

## 2024-01-16 DIAGNOSIS — E03.8 OTHER SPECIFIED HYPOTHYROIDISM: ICD-10-CM

## 2024-01-16 DIAGNOSIS — R79.89 OTHER SPECIFIED ABNORMAL FINDINGS OF BLOOD CHEMISTRY: ICD-10-CM

## 2024-01-16 PROCEDURE — 99214 OFFICE O/P EST MOD 30 MIN: CPT

## 2024-01-16 PROCEDURE — G2211 COMPLEX E/M VISIT ADD ON: CPT

## 2024-01-16 RX ORDER — PANTOPRAZOLE 20 MG/1
20 TABLET, DELAYED RELEASE ORAL
Qty: 60 | Refills: 0 | Status: DISCONTINUED | COMMUNITY
Start: 2023-04-18 | End: 2024-01-16

## 2024-01-16 NOTE — ASSESSMENT
[FreeTextEntry1] : 1 HLD controlled: 143/57/75 well controlled 2 hyperglycemia: a1c 5.7 with diet and exercise 3. LFT normal 4 elevated BP will take at home and let me know what they are and if persistently elevated will need meds 5 anx controlled on 10 mg of prozac.  6 meds all need renewals

## 2024-01-19 ENCOUNTER — NON-APPOINTMENT (OUTPATIENT)
Age: 79
End: 2024-01-19

## 2024-01-24 ENCOUNTER — NON-APPOINTMENT (OUTPATIENT)
Age: 79
End: 2024-01-24

## 2024-02-21 ENCOUNTER — NON-APPOINTMENT (OUTPATIENT)
Age: 79
End: 2024-02-21

## 2024-02-22 ENCOUNTER — NON-APPOINTMENT (OUTPATIENT)
Age: 79
End: 2024-02-22

## 2024-02-29 DIAGNOSIS — N30.90 CYSTITIS, UNSPECIFIED W/OUT HEMATURIA: ICD-10-CM

## 2024-03-04 ENCOUNTER — TRANSCRIPTION ENCOUNTER (OUTPATIENT)
Age: 79
End: 2024-03-04

## 2024-03-04 LAB
APPEARANCE: CLEAR
BILIRUBIN URINE: NEGATIVE
BLOOD URINE: NEGATIVE
COLOR: YELLOW
GLUCOSE QUALITATIVE U: NEGATIVE MG/DL
KETONES URINE: NEGATIVE MG/DL
LEUKOCYTE ESTERASE URINE: NEGATIVE
NITRITE URINE: NEGATIVE
PH URINE: 7
PROTEIN URINE: NEGATIVE MG/DL
SPECIFIC GRAVITY URINE: 1
UROBILINOGEN URINE: 0.2 MG/DL

## 2024-03-05 ENCOUNTER — APPOINTMENT (OUTPATIENT)
Dept: ENDOCRINOLOGY | Facility: CLINIC | Age: 79
End: 2024-03-05

## 2024-03-06 ENCOUNTER — APPOINTMENT (OUTPATIENT)
Dept: ENDOCRINOLOGY | Facility: CLINIC | Age: 79
End: 2024-03-06
Payer: MEDICARE

## 2024-03-06 PROCEDURE — 96372 THER/PROPH/DIAG INJ SC/IM: CPT

## 2024-03-06 RX ORDER — DENOSUMAB 60 MG/ML
60 INJECTION SUBCUTANEOUS
Qty: 1 | Refills: 0 | Status: COMPLETED | OUTPATIENT
Start: 2024-03-01

## 2024-03-09 ENCOUNTER — NON-APPOINTMENT (OUTPATIENT)
Age: 79
End: 2024-03-09

## 2024-03-12 ENCOUNTER — APPOINTMENT (OUTPATIENT)
Dept: INTERNAL MEDICINE | Facility: CLINIC | Age: 79
End: 2024-03-12
Payer: MEDICARE

## 2024-03-12 ENCOUNTER — NON-APPOINTMENT (OUTPATIENT)
Age: 79
End: 2024-03-12

## 2024-03-12 VITALS
WEIGHT: 124 LBS | HEIGHT: 66 IN | RESPIRATION RATE: 15 BRPM | DIASTOLIC BLOOD PRESSURE: 68 MMHG | HEART RATE: 73 BPM | OXYGEN SATURATION: 98 % | BODY MASS INDEX: 19.93 KG/M2 | SYSTOLIC BLOOD PRESSURE: 128 MMHG

## 2024-03-12 DIAGNOSIS — M81.0 AGE-RELATED OSTEOPOROSIS W/OUT CURRENT PATHOLOGICAL FRACTURE: ICD-10-CM

## 2024-03-12 DIAGNOSIS — F41.9 ANXIETY DISORDER, UNSPECIFIED: ICD-10-CM

## 2024-03-12 PROCEDURE — G2211 COMPLEX E/M VISIT ADD ON: CPT

## 2024-03-12 PROCEDURE — 99214 OFFICE O/P EST MOD 30 MIN: CPT

## 2024-03-12 RX ORDER — VALSARTAN 160 MG/1
160 TABLET, COATED ORAL
Qty: 90 | Refills: 3 | Status: ACTIVE | COMMUNITY
Start: 2024-01-24 | End: 1900-01-01

## 2024-03-12 NOTE — HISTORY OF PRESENT ILLNESS
[de-identified] : No longer with urine issues, but injured her left chest when carrying something heavy and went to  and sent home on muscle relaxants, ECG no change BP at home still not always controlled rachel when she takes it at 9 pm [FreeTextEntry1] : here for follow up for her HTN

## 2024-03-12 NOTE — ASSESSMENT
[FreeTextEntry1] : 1 HLD controlled: 143/57/75 well controlled 2 hyperglycemia: a1c 5.7 with diet and exercise 3. LFT normal 4 HTN will increase the valsartan to 160  5 anx not as well controlled on 10 mg of prozac. to increase to 20 mg and follow up. 6 meds all on auto renewal. 4-6 weeks

## 2024-04-08 ENCOUNTER — NON-APPOINTMENT (OUTPATIENT)
Age: 79
End: 2024-04-08

## 2024-04-09 ENCOUNTER — APPOINTMENT (OUTPATIENT)
Dept: INTERNAL MEDICINE | Facility: CLINIC | Age: 79
End: 2024-04-09
Payer: MEDICARE

## 2024-04-09 VITALS
HEART RATE: 67 BPM | SYSTOLIC BLOOD PRESSURE: 146 MMHG | OXYGEN SATURATION: 98 % | BODY MASS INDEX: 20.09 KG/M2 | TEMPERATURE: 97.8 F | DIASTOLIC BLOOD PRESSURE: 73 MMHG | HEIGHT: 66 IN | WEIGHT: 125 LBS

## 2024-04-09 DIAGNOSIS — R73.03 PREDIABETES.: ICD-10-CM

## 2024-04-09 DIAGNOSIS — E78.00 PURE HYPERCHOLESTEROLEMIA, UNSPECIFIED: ICD-10-CM

## 2024-04-09 DIAGNOSIS — D32.9 BENIGN NEOPLASM OF MENINGES, UNSPECIFIED: ICD-10-CM

## 2024-04-09 DIAGNOSIS — I10 ESSENTIAL (PRIMARY) HYPERTENSION: ICD-10-CM

## 2024-04-09 PROCEDURE — 99214 OFFICE O/P EST MOD 30 MIN: CPT

## 2024-04-09 PROCEDURE — G2211 COMPLEX E/M VISIT ADD ON: CPT

## 2024-04-09 NOTE — ASSESSMENT
[FreeTextEntry1] : 1 HLD controlled: 143/57/75 well controlled 2 hyperglycemia: a1c 5.7 with diet and exercise 3. LFT normal 4 HTN controlled on the valsartan  160  5 anx not as well controlled on 10 mg of prozac. now on the  20 mg and improved and in counseling once a week 6 meds all on auto renewal.2 months  7 hyperglycemia repeat via poct in 2 mos

## 2024-04-09 NOTE — HISTORY OF PRESENT ILLNESS
[FreeTextEntry1] : Here for BP check on increased valsartan to 160 [de-identified] : BP at home is generally controlled her BP was perfectly controlled 25/29 x with 13% 150 or less. Seen by HARI Paris and completed, and now with therapy once a week.

## 2024-06-08 ENCOUNTER — RX RENEWAL (OUTPATIENT)
Age: 79
End: 2024-06-08

## 2024-06-08 RX ORDER — FLUOXETINE HYDROCHLORIDE 20 MG/1
20 CAPSULE ORAL
Qty: 90 | Refills: 0 | Status: ACTIVE | COMMUNITY
Start: 2022-01-24 | End: 1900-01-01

## 2024-07-09 ENCOUNTER — TRANSCRIPTION ENCOUNTER (OUTPATIENT)
Age: 79
End: 2024-07-09

## 2024-07-12 LAB
ALBUMIN SERPL ELPH-MCNC: 4.3 G/DL
ALP BLD-CCNC: 42 U/L
ALT SERPL-CCNC: 28 U/L
ANION GAP SERPL CALC-SCNC: 13 MMOL/L
AST SERPL-CCNC: 32 U/L
BILIRUB SERPL-MCNC: 0.3 MG/DL
BUN SERPL-MCNC: 16 MG/DL
CALCIUM SERPL-MCNC: 9.2 MG/DL
CHLORIDE SERPL-SCNC: 101 MMOL/L
CHOLEST SERPL-MCNC: 184 MG/DL
CO2 SERPL-SCNC: 23 MMOL/L
CREAT SERPL-MCNC: 0.84 MG/DL
EGFR: 72 ML/MIN/1.73M2
ESTIMATED AVERAGE GLUCOSE: 120 MG/DL
GLUCOSE SERPL-MCNC: 86 MG/DL
HBA1C MFR BLD HPLC: 5.8 %
HDLC SERPL-MCNC: 73 MG/DL
NONHDLC SERPL-MCNC: 110 MG/DL
POTASSIUM SERPL-SCNC: 4.4 MMOL/L
PROT SERPL-MCNC: 6.7 G/DL
SODIUM SERPL-SCNC: 137 MMOL/L

## 2024-07-23 ENCOUNTER — APPOINTMENT (OUTPATIENT)
Dept: INTERNAL MEDICINE | Facility: CLINIC | Age: 79
End: 2024-07-23

## 2024-07-23 VITALS
TEMPERATURE: 97.9 F | BODY MASS INDEX: 19.93 KG/M2 | OXYGEN SATURATION: 98 % | SYSTOLIC BLOOD PRESSURE: 147 MMHG | HEART RATE: 77 BPM | HEIGHT: 66 IN | DIASTOLIC BLOOD PRESSURE: 71 MMHG | WEIGHT: 124 LBS

## 2024-07-23 DIAGNOSIS — F41.9 ANXIETY DISORDER, UNSPECIFIED: ICD-10-CM

## 2024-07-23 DIAGNOSIS — I10 ESSENTIAL (PRIMARY) HYPERTENSION: ICD-10-CM

## 2024-07-23 DIAGNOSIS — R10.13 EPIGASTRIC PAIN: ICD-10-CM

## 2024-07-23 DIAGNOSIS — D32.9 BENIGN NEOPLASM OF MENINGES, UNSPECIFIED: ICD-10-CM

## 2024-07-23 DIAGNOSIS — R73.03 PREDIABETES.: ICD-10-CM

## 2024-07-23 DIAGNOSIS — E03.8 OTHER SPECIFIED HYPOTHYROIDISM: ICD-10-CM

## 2024-07-23 DIAGNOSIS — M81.0 AGE-RELATED OSTEOPOROSIS W/OUT CURRENT PATHOLOGICAL FRACTURE: ICD-10-CM

## 2024-07-23 DIAGNOSIS — H54.40 BLINDNESS, ONE EYE, UNSPECIFIED EYE: ICD-10-CM

## 2024-07-23 DIAGNOSIS — E78.00 PURE HYPERCHOLESTEROLEMIA, UNSPECIFIED: ICD-10-CM

## 2024-07-23 PROCEDURE — G0444 DEPRESSION SCREEN ANNUAL: CPT | Mod: 59

## 2024-07-23 PROCEDURE — G0439: CPT

## 2024-07-23 NOTE — ASSESSMENT
[FreeTextEntry1] : 1 HLD controlled: 184/73/98 well controlled 2 hyperglycemia: a1c 5.7 with diet and exercise 3. LFT normal 4 HTN controlled on the valsartan  160  both here and always at home, brought in results  5 anx improved on   20 mg and improved  done with counseling but knows she can go back to it in the future, more prozac makes her tired  6 meds all on auto renewal.2 months  7 hyperglycemia  5.8 8 HCM:  colon 2029, prolia Q 6 mos per matt Can 4/25, optho up to date, immunizations  totally up to date for all. ( never had covid)

## 2024-07-23 NOTE — HEALTH RISK ASSESSMENT
[Good] : ~his/her~ current health as good [Fair] :  ~his/her~ mood as fair [No] : No [1 or 2 (0 pts)] : 1 or 2 (0 points) [Never (0 pts)] : Never (0 points) [No falls in past year] : Patient reported no falls in the past year [1] : 1) Little interest or pleasure doing things for several days (1) [0] : 2) Feeling down, depressed, or hopeless: Not at all (0) [PHQ-2 Negative - No further assessment needed] : PHQ-2 Negative - No further assessment needed [I have developed a follow-up plan documented below in the note.] : I have developed a follow-up plan documented below in the note. [de-identified] : regular exer  [facility] [GFS1Dnicc] : 1 [Never] : Never [NO] : No [No Retinopathy] : No retinopathy [EyeExamDate] : 5/24 [Patient reported mammogram was normal] : Patient reported mammogram was normal [Patient reported bone density results were abnormal] : Patient reported bone density results were abnormal [With Significant Other] : lives with significant other [] :  [Feels Safe at Home] : Feels safe at home [Fully functional (bathing, dressing, toileting, transferring, walking, feeding)] : Fully functional (bathing, dressing, toileting, transferring, walking, feeding) [Fully functional (using the telephone, shopping, preparing meals, housekeeping, doing laundry, using] : Fully functional and needs no help or supervision to perform IADLs (using the telephone, shopping, preparing meals, housekeeping, doing laundry, using transportation, managing medications and managing finances) [Smoke Detector] : smoke detector [Carbon Monoxide Detector] : carbon monoxide detector [Seat Belt] :  uses seat belt [Sunscreen] : uses sunscreen [MammogramDate] : 4/8/24 [PapSmearDate] : n/a [BoneDensityDate] : 2.1.23 [ColonoscopyDate] : 2/16/24 [ColonoscopyComments] : 2029 [Name: ___] : Health Care Proxy's Name: [unfilled]  [Relationship: ___] : Relationship: [unfilled] [I will adhere to the patient's wishes.] : I will adhere to the patient's wishes. [AdvancecareDate] : 7.23.24

## 2024-07-23 NOTE — PHYSICAL EXAM
[Declined Rectal Exam] : declined rectal exam [Normal] : affect was normal and insight and judgment were intact [de-identified] : actinic K on her back

## 2024-07-23 NOTE — HISTORY OF PRESENT ILLNESS
[FreeTextEntry1] : here for annual full PE  [de-identified] : BP at home is always controlled her BP was perfectly controlled. Seen by HARI Paris and completed, and now with therapy once a week. Complains of a tremor with some anxiety at times, and has finished her therapy, and comfortable with this decision.

## 2024-09-12 ENCOUNTER — APPOINTMENT (OUTPATIENT)
Dept: ENDOCRINOLOGY | Facility: CLINIC | Age: 79
End: 2024-09-12
Payer: MEDICARE

## 2024-09-12 VITALS
OXYGEN SATURATION: 99 % | WEIGHT: 125 LBS | SYSTOLIC BLOOD PRESSURE: 122 MMHG | BODY MASS INDEX: 20.18 KG/M2 | HEART RATE: 76 BPM | DIASTOLIC BLOOD PRESSURE: 62 MMHG

## 2024-09-12 DIAGNOSIS — R73.03 PREDIABETES.: ICD-10-CM

## 2024-09-12 DIAGNOSIS — M81.0 AGE-RELATED OSTEOPOROSIS W/OUT CURRENT PATHOLOGICAL FRACTURE: ICD-10-CM

## 2024-09-12 PROCEDURE — 99214 OFFICE O/P EST MOD 30 MIN: CPT | Mod: 25

## 2024-09-12 PROCEDURE — 96372 THER/PROPH/DIAG INJ SC/IM: CPT

## 2024-09-12 RX ORDER — DENOSUMAB 60 MG/ML
60 INJECTION SUBCUTANEOUS
Qty: 1 | Refills: 0 | Status: COMPLETED | OUTPATIENT
Start: 2024-09-12

## 2024-09-12 RX ADMIN — DENOSUMAB 60 MG/ML: 60 INJECTION SUBCUTANEOUS at 00:00

## 2024-10-31 ENCOUNTER — APPOINTMENT (OUTPATIENT)
Dept: INTERNAL MEDICINE | Facility: CLINIC | Age: 79
End: 2024-10-31
Payer: MEDICARE

## 2024-10-31 VITALS
BODY MASS INDEX: 20.25 KG/M2 | HEIGHT: 66 IN | TEMPERATURE: 97.7 F | SYSTOLIC BLOOD PRESSURE: 164 MMHG | HEART RATE: 86 BPM | WEIGHT: 126 LBS | OXYGEN SATURATION: 98 % | DIASTOLIC BLOOD PRESSURE: 66 MMHG

## 2024-10-31 DIAGNOSIS — R73.03 PREDIABETES.: ICD-10-CM

## 2024-10-31 DIAGNOSIS — E78.00 PURE HYPERCHOLESTEROLEMIA, UNSPECIFIED: ICD-10-CM

## 2024-10-31 DIAGNOSIS — I10 ESSENTIAL (PRIMARY) HYPERTENSION: ICD-10-CM

## 2024-10-31 PROCEDURE — G2211 COMPLEX E/M VISIT ADD ON: CPT

## 2024-10-31 PROCEDURE — 99214 OFFICE O/P EST MOD 30 MIN: CPT

## 2024-11-19 ENCOUNTER — APPOINTMENT (OUTPATIENT)
Dept: INTERNAL MEDICINE | Facility: CLINIC | Age: 79
End: 2024-11-19
Payer: MEDICARE

## 2024-11-19 VITALS
OXYGEN SATURATION: 96 % | SYSTOLIC BLOOD PRESSURE: 173 MMHG | BODY MASS INDEX: 20.25 KG/M2 | WEIGHT: 126 LBS | TEMPERATURE: 97.6 F | DIASTOLIC BLOOD PRESSURE: 83 MMHG | HEIGHT: 66 IN | HEART RATE: 80 BPM

## 2024-11-19 DIAGNOSIS — R73.03 PREDIABETES.: ICD-10-CM

## 2024-11-19 DIAGNOSIS — E03.8 OTHER SPECIFIED HYPOTHYROIDISM: ICD-10-CM

## 2024-11-19 DIAGNOSIS — I10 ESSENTIAL (PRIMARY) HYPERTENSION: ICD-10-CM

## 2024-11-19 DIAGNOSIS — E78.00 PURE HYPERCHOLESTEROLEMIA, UNSPECIFIED: ICD-10-CM

## 2024-11-19 DIAGNOSIS — F41.9 ANXIETY DISORDER, UNSPECIFIED: ICD-10-CM

## 2024-11-19 PROCEDURE — G2211 COMPLEX E/M VISIT ADD ON: CPT

## 2024-11-19 PROCEDURE — 99213 OFFICE O/P EST LOW 20 MIN: CPT

## 2024-11-22 ENCOUNTER — APPOINTMENT (OUTPATIENT)
Dept: INTERNAL MEDICINE | Facility: CLINIC | Age: 79
End: 2024-11-22

## 2025-02-09 NOTE — HISTORY OF PRESENT ILLNESS
[FreeTextEntry1] : Here for follow up for choles and wt loss and to discuss her CT  and anxiety. Took the Paxil and dev blurred vision, and some nausea and vomiting and feels better off of it .  [de-identified] : Here for follow up for wt loss, has been stable to increased as this time she took off her shoes. She did not tolerated the paxil as she dev nausea, vomiting nad blurred vision .  No

## 2025-03-25 ENCOUNTER — APPOINTMENT (OUTPATIENT)
Dept: ENDOCRINOLOGY | Facility: CLINIC | Age: 80
End: 2025-03-25
Payer: MEDICARE

## 2025-03-25 ENCOUNTER — NON-APPOINTMENT (OUTPATIENT)
Age: 80
End: 2025-03-25

## 2025-03-25 VITALS
DIASTOLIC BLOOD PRESSURE: 72 MMHG | WEIGHT: 121 LBS | OXYGEN SATURATION: 97 % | HEIGHT: 65.2 IN | BODY MASS INDEX: 19.92 KG/M2 | SYSTOLIC BLOOD PRESSURE: 132 MMHG | HEART RATE: 75 BPM

## 2025-03-25 PROCEDURE — 77080 DXA BONE DENSITY AXIAL: CPT | Mod: GA

## 2025-03-25 PROCEDURE — 99213 OFFICE O/P EST LOW 20 MIN: CPT | Mod: 25

## 2025-03-25 PROCEDURE — ZZZZZ: CPT

## 2025-03-25 PROCEDURE — 96372 THER/PROPH/DIAG INJ SC/IM: CPT

## 2025-03-25 RX ORDER — DENOSUMAB 60 MG/ML
60 INJECTION SUBCUTANEOUS
Qty: 1 | Refills: 0 | Status: COMPLETED | OUTPATIENT
Start: 2025-03-25

## 2025-03-25 RX ADMIN — DENOSUMAB 60 MG/ML: 60 INJECTION SUBCUTANEOUS at 00:00

## 2025-05-01 ENCOUNTER — APPOINTMENT (OUTPATIENT)
Dept: INTERNAL MEDICINE | Facility: CLINIC | Age: 80
End: 2025-05-01
Payer: MEDICARE

## 2025-05-01 VITALS
HEART RATE: 74 BPM | TEMPERATURE: 97.9 F | OXYGEN SATURATION: 99 % | DIASTOLIC BLOOD PRESSURE: 85 MMHG | HEIGHT: 65.2 IN | WEIGHT: 125 LBS | BODY MASS INDEX: 20.58 KG/M2 | SYSTOLIC BLOOD PRESSURE: 158 MMHG

## 2025-05-01 DIAGNOSIS — R10.13 EPIGASTRIC PAIN: ICD-10-CM

## 2025-05-01 DIAGNOSIS — R73.03 PREDIABETES.: ICD-10-CM

## 2025-05-01 DIAGNOSIS — D32.9 BENIGN NEOPLASM OF MENINGES, UNSPECIFIED: ICD-10-CM

## 2025-05-01 DIAGNOSIS — E78.00 PURE HYPERCHOLESTEROLEMIA, UNSPECIFIED: ICD-10-CM

## 2025-05-01 DIAGNOSIS — R79.89 OTHER SPECIFIED ABNORMAL FINDINGS OF BLOOD CHEMISTRY: ICD-10-CM

## 2025-05-01 DIAGNOSIS — I10 ESSENTIAL (PRIMARY) HYPERTENSION: ICD-10-CM

## 2025-05-01 DIAGNOSIS — H54.40 BLINDNESS, ONE EYE, UNSPECIFIED EYE: ICD-10-CM

## 2025-05-01 DIAGNOSIS — F41.9 ANXIETY DISORDER, UNSPECIFIED: ICD-10-CM

## 2025-05-01 PROCEDURE — G2211 COMPLEX E/M VISIT ADD ON: CPT

## 2025-05-01 PROCEDURE — 99214 OFFICE O/P EST MOD 30 MIN: CPT

## 2025-05-16 ENCOUNTER — NON-APPOINTMENT (OUTPATIENT)
Age: 80
End: 2025-05-16

## 2025-05-16 ENCOUNTER — APPOINTMENT (OUTPATIENT)
Dept: CARDIOLOGY | Facility: CLINIC | Age: 80
End: 2025-05-16
Payer: MEDICARE

## 2025-05-16 VITALS
OXYGEN SATURATION: 98 % | HEIGHT: 65 IN | WEIGHT: 125 LBS | DIASTOLIC BLOOD PRESSURE: 62 MMHG | BODY MASS INDEX: 20.83 KG/M2 | HEART RATE: 70 BPM | SYSTOLIC BLOOD PRESSURE: 110 MMHG

## 2025-05-16 DIAGNOSIS — I10 ESSENTIAL (PRIMARY) HYPERTENSION: ICD-10-CM

## 2025-05-16 DIAGNOSIS — E78.00 PURE HYPERCHOLESTEROLEMIA, UNSPECIFIED: ICD-10-CM

## 2025-05-16 DIAGNOSIS — R73.03 PREDIABETES.: ICD-10-CM

## 2025-05-16 DIAGNOSIS — F41.9 ANXIETY DISORDER, UNSPECIFIED: ICD-10-CM

## 2025-05-16 DIAGNOSIS — M81.0 AGE-RELATED OSTEOPOROSIS W/OUT CURRENT PATHOLOGICAL FRACTURE: ICD-10-CM

## 2025-05-16 PROCEDURE — G2211 COMPLEX E/M VISIT ADD ON: CPT

## 2025-05-16 PROCEDURE — 93000 ELECTROCARDIOGRAM COMPLETE: CPT

## 2025-05-16 PROCEDURE — 99204 OFFICE O/P NEW MOD 45 MIN: CPT
